# Patient Record
Sex: FEMALE | Race: WHITE | NOT HISPANIC OR LATINO | Employment: UNEMPLOYED | URBAN - METROPOLITAN AREA
[De-identification: names, ages, dates, MRNs, and addresses within clinical notes are randomized per-mention and may not be internally consistent; named-entity substitution may affect disease eponyms.]

---

## 2021-07-26 LAB — CRC RECOMMENDATION EXT: NORMAL

## 2021-09-13 ENCOUNTER — TELEPHONE (OUTPATIENT)
Dept: OPHTHALMOLOGY | Facility: CLINIC | Age: 41
End: 2021-09-13

## 2021-09-15 ENCOUNTER — OFFICE VISIT (OUTPATIENT)
Dept: OPTOMETRY | Facility: CLINIC | Age: 41
End: 2021-09-15
Payer: COMMERCIAL

## 2021-09-15 DIAGNOSIS — H52.13 MYOPIA WITH PRESBYOPIA OF BOTH EYES: Primary | ICD-10-CM

## 2021-09-15 DIAGNOSIS — H52.4 MYOPIA WITH PRESBYOPIA OF BOTH EYES: Primary | ICD-10-CM

## 2021-09-15 DIAGNOSIS — Z97.3 WEARS CONTACT LENSES: ICD-10-CM

## 2021-09-15 DIAGNOSIS — H52.13 MYOPIA WITH PRESBYOPIA OF BOTH EYES: ICD-10-CM

## 2021-09-15 DIAGNOSIS — H53.9 VISION DISTURBANCE: Primary | ICD-10-CM

## 2021-09-15 DIAGNOSIS — H52.4 MYOPIA WITH PRESBYOPIA OF BOTH EYES: ICD-10-CM

## 2021-09-15 PROCEDURE — 92310 PR CONTACT LENS FITTING (NO CHANGE): ICD-10-PCS | Mod: CSM,S$GLB,, | Performed by: OPTOMETRIST

## 2021-09-15 PROCEDURE — 92004 PR EYE EXAM, NEW PATIENT,COMPREHESV: ICD-10-PCS | Mod: S$GLB,,, | Performed by: OPTOMETRIST

## 2021-09-15 PROCEDURE — 1159F PR MEDICATION LIST DOCUMENTED IN MEDICAL RECORD: ICD-10-PCS | Mod: CPTII,S$GLB,, | Performed by: OPTOMETRIST

## 2021-09-15 PROCEDURE — 1159F MED LIST DOCD IN RCRD: CPT | Mod: CPTII,S$GLB,, | Performed by: OPTOMETRIST

## 2021-09-15 PROCEDURE — 99999 PR PBB SHADOW E&M-EST. PATIENT-LVL II: CPT | Mod: PBBFAC,,, | Performed by: OPTOMETRIST

## 2021-09-15 PROCEDURE — 99499 NO LOS: ICD-10-PCS | Mod: S$GLB,,, | Performed by: OPTOMETRIST

## 2021-09-15 PROCEDURE — 99499 UNLISTED E&M SERVICE: CPT | Mod: S$GLB,,, | Performed by: OPTOMETRIST

## 2021-09-15 PROCEDURE — 92004 COMPRE OPH EXAM NEW PT 1/>: CPT | Mod: S$GLB,,, | Performed by: OPTOMETRIST

## 2021-09-15 PROCEDURE — 99999 PR PBB SHADOW E&M-EST. PATIENT-LVL II: ICD-10-PCS | Mod: PBBFAC,,, | Performed by: OPTOMETRIST

## 2021-09-15 PROCEDURE — 92310 CONTACT LENS FITTING OU: CPT | Mod: CSM,S$GLB,, | Performed by: OPTOMETRIST

## 2021-09-15 RX ORDER — PANTOPRAZOLE SODIUM 40 MG/1
40 TABLET, DELAYED RELEASE ORAL EVERY MORNING
COMMUNITY
Start: 2021-07-26 | End: 2023-03-28

## 2021-09-15 RX ORDER — SOD SULF/POT CHLORIDE/MAG SULF 1.479 G
TABLET ORAL
COMMUNITY
Start: 2021-07-19 | End: 2021-09-15

## 2021-09-15 RX ORDER — METRONIDAZOLE 7.5 MG/G
CREAM TOPICAL
COMMUNITY
Start: 2021-05-12 | End: 2024-03-05

## 2021-09-16 PROBLEM — Z97.3 WEARS CONTACT LENSES: Status: ACTIVE | Noted: 2021-09-16

## 2021-09-27 ENCOUNTER — LAB VISIT (OUTPATIENT)
Dept: LAB | Facility: OTHER | Age: 41
End: 2021-09-27
Attending: INTERNAL MEDICINE
Payer: COMMERCIAL

## 2021-09-27 DIAGNOSIS — R14.0 ABDOMINAL DISTENTION: ICD-10-CM

## 2021-09-27 DIAGNOSIS — K22.10 ULCER OF ESOPHAGUS WITHOUT BLEEDING: ICD-10-CM

## 2021-09-27 DIAGNOSIS — K52.9 CHRONIC DIARRHEA OF UNKNOWN ORIGIN: Primary | ICD-10-CM

## 2021-09-27 LAB
25(OH)D3+25(OH)D2 SERPL-MCNC: 50 NG/ML (ref 30–96)
ALBUMIN SERPL BCP-MCNC: 4.4 G/DL (ref 3.5–5.2)
ALP SERPL-CCNC: 55 U/L (ref 55–135)
ALT SERPL W/O P-5'-P-CCNC: 10 U/L (ref 10–44)
ANION GAP SERPL CALC-SCNC: 10 MMOL/L (ref 8–16)
AST SERPL-CCNC: 25 U/L (ref 10–40)
BASOPHILS # BLD AUTO: 0.05 K/UL (ref 0–0.2)
BASOPHILS NFR BLD: 1 % (ref 0–1.9)
BILIRUB SERPL-MCNC: 0.5 MG/DL (ref 0.1–1)
BUN SERPL-MCNC: 11 MG/DL (ref 6–20)
CALCIUM SERPL-MCNC: 9.8 MG/DL (ref 8.7–10.5)
CHLORIDE SERPL-SCNC: 104 MMOL/L (ref 95–110)
CO2 SERPL-SCNC: 23 MMOL/L (ref 23–29)
CREAT SERPL-MCNC: 0.8 MG/DL (ref 0.5–1.4)
CRP SERPL-MCNC: 0.6 MG/L (ref 0–8.2)
DIFFERENTIAL METHOD: ABNORMAL
EOSINOPHIL # BLD AUTO: 0.1 K/UL (ref 0–0.5)
EOSINOPHIL NFR BLD: 2 % (ref 0–8)
ERYTHROCYTE [DISTWIDTH] IN BLOOD BY AUTOMATED COUNT: 11.9 % (ref 11.5–14.5)
EST. GFR  (AFRICAN AMERICAN): >60 ML/MIN/1.73 M^2
EST. GFR  (NON AFRICAN AMERICAN): >60 ML/MIN/1.73 M^2
GLUCOSE SERPL-MCNC: 93 MG/DL (ref 70–110)
HCT VFR BLD AUTO: 40.3 % (ref 37–48.5)
HGB BLD-MCNC: 13.6 G/DL (ref 12–16)
IMM GRANULOCYTES # BLD AUTO: 0.01 K/UL (ref 0–0.04)
IMM GRANULOCYTES NFR BLD AUTO: 0.2 % (ref 0–0.5)
LYMPHOCYTES # BLD AUTO: 2 K/UL (ref 1–4.8)
LYMPHOCYTES NFR BLD: 41.5 % (ref 18–48)
MCH RBC QN AUTO: 33.4 PG (ref 27–31)
MCHC RBC AUTO-ENTMCNC: 33.7 G/DL (ref 32–36)
MCV RBC AUTO: 99 FL (ref 82–98)
MONOCYTES # BLD AUTO: 0.4 K/UL (ref 0.3–1)
MONOCYTES NFR BLD: 9 % (ref 4–15)
NEUTROPHILS # BLD AUTO: 2.3 K/UL (ref 1.8–7.7)
NEUTROPHILS NFR BLD: 46.3 % (ref 38–73)
NRBC BLD-RTO: 0 /100 WBC
PLATELET # BLD AUTO: 230 K/UL (ref 150–450)
PMV BLD AUTO: 9.3 FL (ref 9.2–12.9)
POTASSIUM SERPL-SCNC: 4.7 MMOL/L (ref 3.5–5.1)
PROT SERPL-MCNC: 7.7 G/DL (ref 6–8.4)
RBC # BLD AUTO: 4.07 M/UL (ref 4–5.4)
SODIUM SERPL-SCNC: 137 MMOL/L (ref 136–145)
TSH SERPL DL<=0.005 MIU/L-ACNC: 1.45 UIU/ML (ref 0.4–4)
WBC # BLD AUTO: 4.91 K/UL (ref 3.9–12.7)

## 2021-09-27 PROCEDURE — 80053 COMPREHEN METABOLIC PANEL: CPT | Performed by: INTERNAL MEDICINE

## 2021-09-27 PROCEDURE — 84443 ASSAY THYROID STIM HORMONE: CPT | Performed by: INTERNAL MEDICINE

## 2021-09-27 PROCEDURE — 36415 COLL VENOUS BLD VENIPUNCTURE: CPT | Performed by: INTERNAL MEDICINE

## 2021-09-27 PROCEDURE — 86140 C-REACTIVE PROTEIN: CPT | Performed by: INTERNAL MEDICINE

## 2021-09-27 PROCEDURE — 82306 VITAMIN D 25 HYDROXY: CPT | Performed by: INTERNAL MEDICINE

## 2021-09-27 PROCEDURE — 83516 IMMUNOASSAY NONANTIBODY: CPT | Mod: 59 | Performed by: INTERNAL MEDICINE

## 2021-09-27 PROCEDURE — 85025 COMPLETE CBC W/AUTO DIFF WBC: CPT | Performed by: INTERNAL MEDICINE

## 2021-09-30 LAB
GLIADIN PEPTIDE IGA SER-ACNC: 5 UNITS
GLIADIN PEPTIDE IGG SER-ACNC: 2 UNITS
IGA SERPL-MCNC: 250 MG/DL (ref 70–400)
TTG IGA SER-ACNC: 7 UNITS
TTG IGG SER-ACNC: 3 UNITS

## 2021-12-14 ENCOUNTER — PATIENT MESSAGE (OUTPATIENT)
Dept: OBSTETRICS AND GYNECOLOGY | Facility: CLINIC | Age: 41
End: 2021-12-14

## 2021-12-14 ENCOUNTER — OFFICE VISIT (OUTPATIENT)
Dept: OBSTETRICS AND GYNECOLOGY | Facility: CLINIC | Age: 41
End: 2021-12-14
Payer: COMMERCIAL

## 2021-12-14 ENCOUNTER — PATIENT MESSAGE (OUTPATIENT)
Dept: DERMATOLOGY | Facility: CLINIC | Age: 41
End: 2021-12-14

## 2021-12-14 ENCOUNTER — OFFICE VISIT (OUTPATIENT)
Dept: DERMATOLOGY | Facility: CLINIC | Age: 41
End: 2021-12-14
Payer: COMMERCIAL

## 2021-12-14 VITALS
WEIGHT: 156.31 LBS | BODY MASS INDEX: 26.04 KG/M2 | HEIGHT: 65 IN | DIASTOLIC BLOOD PRESSURE: 82 MMHG | SYSTOLIC BLOOD PRESSURE: 106 MMHG

## 2021-12-14 DIAGNOSIS — Z01.419 WELL WOMAN EXAM: ICD-10-CM

## 2021-12-14 DIAGNOSIS — B09 VIRAL EXANTHEM: ICD-10-CM

## 2021-12-14 DIAGNOSIS — Z12.39 ENCOUNTER FOR SCREENING BREAST EXAMINATION: Primary | ICD-10-CM

## 2021-12-14 DIAGNOSIS — R21 SKIN RASH: ICD-10-CM

## 2021-12-14 PROCEDURE — 99999 PR PBB SHADOW E&M-NEW PATIENT-LVL III: CPT | Mod: PBBFAC,,, | Performed by: OBSTETRICS & GYNECOLOGY

## 2021-12-14 PROCEDURE — 99386 PR PREVENTIVE VISIT,NEW,40-64: ICD-10-PCS | Mod: S$GLB,,, | Performed by: OBSTETRICS & GYNECOLOGY

## 2021-12-14 PROCEDURE — 99999 PR PBB SHADOW E&M-EST. PATIENT-LVL II: CPT | Mod: PBBFAC,,, | Performed by: DERMATOLOGY

## 2021-12-14 PROCEDURE — 99204 OFFICE O/P NEW MOD 45 MIN: CPT | Mod: S$GLB,,, | Performed by: DERMATOLOGY

## 2021-12-14 PROCEDURE — 99999 PR PBB SHADOW E&M-EST. PATIENT-LVL II: ICD-10-PCS | Mod: PBBFAC,,, | Performed by: DERMATOLOGY

## 2021-12-14 PROCEDURE — 99204 PR OFFICE/OUTPT VISIT, NEW, LEVL IV, 45-59 MIN: ICD-10-PCS | Mod: S$GLB,,, | Performed by: DERMATOLOGY

## 2021-12-14 PROCEDURE — 99386 PREV VISIT NEW AGE 40-64: CPT | Mod: S$GLB,,, | Performed by: OBSTETRICS & GYNECOLOGY

## 2021-12-14 PROCEDURE — 99999 PR PBB SHADOW E&M-NEW PATIENT-LVL III: ICD-10-PCS | Mod: PBBFAC,,, | Performed by: OBSTETRICS & GYNECOLOGY

## 2021-12-14 PROCEDURE — 87624 HPV HI-RISK TYP POOLED RSLT: CPT | Performed by: OBSTETRICS & GYNECOLOGY

## 2021-12-14 RX ORDER — TRIAMCINOLONE ACETONIDE 1 MG/G
CREAM TOPICAL 2 TIMES DAILY
Qty: 454 G | Refills: 1 | Status: SHIPPED | OUTPATIENT
Start: 2021-12-14 | End: 2023-03-28 | Stop reason: ALTCHOICE

## 2021-12-14 NOTE — PROGRESS NOTES
Subjective:       Patient ID:  Dot Doyle is a 41 y.o. female who presents for   Chief Complaint   Patient presents with    Rash     Patient is a 40 yo female present for Rash on Ablodomen and legs. Rash present for 1 day. Red and itching No treatment  was referred by her OBGYN for this rash. Pt denies any new medications prior to rash erupting. Has had no fevers, chills, congestion.  It is red and splotchy. She has not had a similar rash previously.    Review of Systems   Skin: Positive for itching, rash and dry skin.        Objective:    Physical Exam   Constitutional: She appears well-developed and well-nourished.   Neurological: She is alert and oriented to person, place, and time.   Psychiatric: She has a normal mood and affect.   Skin:   Areas Examined (abnormalities noted in diagram):   Scalp / Hair Palpated and Inspected  Head / Face Inspection Performed  Neck Inspection Performed  Chest / Axilla Inspection Performed  Abdomen Inspection Performed  Genitals / Buttocks / Groin Inspection Performed  Back Inspection Performed  RUE Inspected  LUE Inspection Performed  RLE Inspected  LLE Inspection Performed  Nails and Digits Inspection Performed              Diagram Legend     Erythematous scaling macule/papule c/w actinic keratosis       Vascular papule c/w angioma      Pigmented verrucoid papule/plaque c/w seborrheic keratosis      Yellow umbilicated papule c/w sebaceous hyperplasia      Irregularly shaped tan macule c/w lentigo     1-2 mm smooth white papules consistent with Milia      Movable subcutaneous cyst with punctum c/w epidermal inclusion cyst      Subcutaneous movable cyst c/w pilar cyst      Firm pink to brown papule c/w dermatofibroma      Pedunculated fleshy papule(s) c/w skin tag(s)      Evenly pigmented macule c/w junctional nevus     Mildly variegated pigmented, slightly irregular-bordered macule c/w mildly atypical nevus      Flesh colored to evenly pigmented papule c/w intradermal  nevus       Pink pearly papule/plaque c/w basal cell carcinoma      Erythematous hyperkeratotic cursted plaque c/w SCC      Surgical scar with no sign of skin cancer recurrence      Open and closed comedones      Inflammatory papules and pustules      Verrucoid papule consistent consistent with wart     Erythematous eczematous patches and plaques     Dystrophic onycholytic nail with subungual debris c/w onychomycosis     Umbilicated papule    Erythematous-base heme-crusted tan verrucoid plaque consistent with inflamed seborrheic keratosis     Erythematous Silvery Scaling Plaque c/w Psoriasis     See annotation      Assessment / Plan:        Viral exanthem  -     triamcinolone acetonide 0.1% (KENALOG) 0.1 % cream; Apply topically 2 (two) times daily. To rash x 2 weeks PRN  Dispense: 454 g; Refill: 1  Advised pt to obtain COVID testing as COVID can manifest with a similar exanthem although her rash is nonspecific and could be associated with several viruses -  Pt is otherwise asymptomatic but will provide topical steroid to assist with healing    Notified that rash may spread before resolving           Follow up if symptoms worsen or fail to improve.

## 2021-12-18 LAB
CYTOLOGIST CVX/VAG CYTO: NORMAL
CYTOLOGY CVX/VAG DOC CYTO: NORMAL
CYTOLOGY CVX/VAG DOC THIN PREP: NORMAL
CYTOLOGY THINPREP PAP COMMENT: NORMAL
HPV HR 12 DNA CVX QL NAA+PROBE: NEGATIVE
HPV16 DNA CVX QL NAA+PROBE: NEGATIVE
HPV18 DNA CVX QL NAA+PROBE: NEGATIVE
PAP NOTE: NORMAL
STAT OF ADQ CVX/VAG CYTO-IMP: NORMAL

## 2022-07-08 ENCOUNTER — PATIENT MESSAGE (OUTPATIENT)
Dept: OPTOMETRY | Facility: CLINIC | Age: 42
End: 2022-07-08
Payer: COMMERCIAL

## 2022-07-18 ENCOUNTER — PATIENT MESSAGE (OUTPATIENT)
Dept: DERMATOLOGY | Facility: CLINIC | Age: 42
End: 2022-07-18
Payer: COMMERCIAL

## 2022-08-30 ENCOUNTER — OFFICE VISIT (OUTPATIENT)
Dept: OBSTETRICS AND GYNECOLOGY | Facility: CLINIC | Age: 42
End: 2022-08-30
Attending: OBSTETRICS & GYNECOLOGY
Payer: COMMERCIAL

## 2022-08-30 VITALS
BODY MASS INDEX: 25.71 KG/M2 | DIASTOLIC BLOOD PRESSURE: 79 MMHG | HEIGHT: 65 IN | SYSTOLIC BLOOD PRESSURE: 108 MMHG | WEIGHT: 154.31 LBS

## 2022-08-30 DIAGNOSIS — N93.9 ABNORMAL UTERINE BLEEDING (AUB): ICD-10-CM

## 2022-08-30 DIAGNOSIS — R53.83 FATIGUE, UNSPECIFIED TYPE: ICD-10-CM

## 2022-08-30 DIAGNOSIS — N92.4 EXCESSIVE BLEEDING IN PREMENOPAUSAL PERIOD: Primary | ICD-10-CM

## 2022-08-30 DIAGNOSIS — Z12.39 SCREENING BREAST EXAMINATION: ICD-10-CM

## 2022-08-30 PROCEDURE — 99999 PR PBB SHADOW E&M-EST. PATIENT-LVL III: ICD-10-PCS | Mod: PBBFAC,,, | Performed by: OBSTETRICS & GYNECOLOGY

## 2022-08-30 PROCEDURE — 3044F PR MOST RECENT HEMOGLOBIN A1C LEVEL <7.0%: ICD-10-PCS | Mod: CPTII,S$GLB,, | Performed by: OBSTETRICS & GYNECOLOGY

## 2022-08-30 PROCEDURE — 99213 OFFICE O/P EST LOW 20 MIN: CPT | Mod: S$GLB,,, | Performed by: OBSTETRICS & GYNECOLOGY

## 2022-08-30 PROCEDURE — 99999 PR PBB SHADOW E&M-EST. PATIENT-LVL III: CPT | Mod: PBBFAC,,, | Performed by: OBSTETRICS & GYNECOLOGY

## 2022-08-30 PROCEDURE — 3078F PR MOST RECENT DIASTOLIC BLOOD PRESSURE < 80 MM HG: ICD-10-PCS | Mod: CPTII,S$GLB,, | Performed by: OBSTETRICS & GYNECOLOGY

## 2022-08-30 PROCEDURE — 3074F SYST BP LT 130 MM HG: CPT | Mod: CPTII,S$GLB,, | Performed by: OBSTETRICS & GYNECOLOGY

## 2022-08-30 PROCEDURE — 3074F PR MOST RECENT SYSTOLIC BLOOD PRESSURE < 130 MM HG: ICD-10-PCS | Mod: CPTII,S$GLB,, | Performed by: OBSTETRICS & GYNECOLOGY

## 2022-08-30 PROCEDURE — 3044F HG A1C LEVEL LT 7.0%: CPT | Mod: CPTII,S$GLB,, | Performed by: OBSTETRICS & GYNECOLOGY

## 2022-08-30 PROCEDURE — 3078F DIAST BP <80 MM HG: CPT | Mod: CPTII,S$GLB,, | Performed by: OBSTETRICS & GYNECOLOGY

## 2022-08-30 PROCEDURE — 99213 PR OFFICE/OUTPT VISIT, EST, LEVL III, 20-29 MIN: ICD-10-PCS | Mod: S$GLB,,, | Performed by: OBSTETRICS & GYNECOLOGY

## 2022-08-30 NOTE — PROGRESS NOTES
"CC:change in menstrual cycles    HPI:periods are now occurring every 3 weeks, now very consistent.  Has Paragard IUD has had x 5 years due to heterozygous factor V and MTHFR mutation. Prior to that had been on OCPs.   Gained weight with Covid and has struggled to lose weight. Recently tried strict keto and no results.  No hot flashes or night sweats, has noted more irritability and cramping prior to periods. Periods are now heavier but lasting only 4 days.    Recently moved back to the area from [place], has been doing hour blast 2-3x /week, yoga, running.    ROS:  GENERAL: Feeling well overall. Denies fever or chills.   SKIN: Denies rash or lesions.   HEAD: Denies head injury or headache.   NODES: Denies enlarged lymph nodes.   CHEST: Denies chest pain or shortness of breath.   CARDIOVASCULAR: Denies palpitations or left sided chest pain.   ABDOMEN: No abdominal pain, constipation, diarrhea, vomiting or rectal bleeding. + nausea (neg UPT)  URINARY: No dysuria, hematuria, or burning on urination.  REPRODUCTIVE: See HPI.   BREASTS: Denies pain, lumps, or nipple discharge.   HEMATOLOGIC: No easy bruisability or excessive bleeding.   MUSCULOSKELETAL: Denies joint pain or swelling.   NEUROLOGIC: Denies syncope or weakness.   PSYCHIATRIC: Denies depression, anxiety or mood swings.    PE:   /79 (BP Location: Right arm, Patient Position: Sitting, BP Method: Medium (Automatic))   Ht 5' 5" (1.651 m)   Wt 70 kg (154 lb 5.2 oz)   LMP 08/30/2022 (Approximate)   BMI 25.68 kg/m²     APPEARANCE: Well nourished, well developed, White female in no acute distress.  NODES: no cervical, supraclavicular, or inguinal lymphadenopathy  BREASTS: Symmetrical, no skin changes or visible lesions. No palpable masses, nipple discharge or adenopathy bilaterally.  ABDOMEN: Soft. No tenderness or masses. No distention. No hernias palpated. No CVA tenderness.  VULVA: No lesions. Normal external female genitalia.  URETHRAL MEATUS: Normal " size and location, no lesions, no prolapse.  URETHRA: No masses, tenderness, or prolapse.  VAGINA: Moist. No lesions or lacerations noted. No abnormal discharge present. No odor present.   CERVIX: No lesions or discharge. No cervical motion tenderness.   UTERUS: Normal size, regular shape, mobile, non-tender.  ADNEXA: No tenderness. No fullness or masses palpated in the adnexal regions.   ANUS PERINEUM: Normal.      Diagnosis:  1. Excessive bleeding in premenopausal period    2. Fatigue, unspecified type    3. Screening breast examination    4. Abnormal uterine bleeding (AUB)        Plan:   Return for mirena insertion and paragard removal  Orders Placed This Encounter    Mammo Digital Screening Bilat w/ Brennan    US Pelvis Comp with Transvag NON-OB (xpd    TSH    T4, FREE    Follicle Stimulating Hormone    CBC Auto Differential    Estradiol    Testosterone, free    Hemoglobin A1C         Gwen Rodríguez, DO

## 2022-08-31 ENCOUNTER — PATIENT MESSAGE (OUTPATIENT)
Dept: OBSTETRICS AND GYNECOLOGY | Facility: CLINIC | Age: 42
End: 2022-08-31
Payer: COMMERCIAL

## 2022-08-31 ENCOUNTER — LAB VISIT (OUTPATIENT)
Dept: LAB | Facility: OTHER | Age: 42
End: 2022-08-31
Attending: OBSTETRICS & GYNECOLOGY
Payer: COMMERCIAL

## 2022-08-31 DIAGNOSIS — R53.83 FATIGUE, UNSPECIFIED TYPE: ICD-10-CM

## 2022-08-31 DIAGNOSIS — Z30.9 ENCOUNTER FOR CONTRACEPTIVE MANAGEMENT, UNSPECIFIED TYPE: Primary | ICD-10-CM

## 2022-08-31 DIAGNOSIS — N92.4 EXCESSIVE BLEEDING IN PREMENOPAUSAL PERIOD: ICD-10-CM

## 2022-08-31 LAB
BASOPHILS # BLD AUTO: 0.05 K/UL (ref 0–0.2)
BASOPHILS NFR BLD: 1.1 % (ref 0–1.9)
DIFFERENTIAL METHOD: ABNORMAL
EOSINOPHIL # BLD AUTO: 0.1 K/UL (ref 0–0.5)
EOSINOPHIL NFR BLD: 2.1 % (ref 0–8)
ERYTHROCYTE [DISTWIDTH] IN BLOOD BY AUTOMATED COUNT: 12.1 % (ref 11.5–14.5)
ESTIMATED AVG GLUCOSE: 97 MG/DL (ref 68–131)
ESTRADIOL SERPL-MCNC: 170 PG/ML
FSH SERPL-ACNC: 3 MIU/ML
HBA1C MFR BLD: 5 % (ref 4–5.6)
HCT VFR BLD AUTO: 40.4 % (ref 37–48.5)
HGB BLD-MCNC: 13.6 G/DL (ref 12–16)
IMM GRANULOCYTES # BLD AUTO: 0.01 K/UL (ref 0–0.04)
IMM GRANULOCYTES NFR BLD AUTO: 0.2 % (ref 0–0.5)
LYMPHOCYTES # BLD AUTO: 1.8 K/UL (ref 1–4.8)
LYMPHOCYTES NFR BLD: 36.8 % (ref 18–48)
MCH RBC QN AUTO: 33.3 PG (ref 27–31)
MCHC RBC AUTO-ENTMCNC: 33.7 G/DL (ref 32–36)
MCV RBC AUTO: 99 FL (ref 82–98)
MONOCYTES # BLD AUTO: 0.4 K/UL (ref 0.3–1)
MONOCYTES NFR BLD: 9.3 % (ref 4–15)
NEUTROPHILS # BLD AUTO: 2.4 K/UL (ref 1.8–7.7)
NEUTROPHILS NFR BLD: 50.5 % (ref 38–73)
NRBC BLD-RTO: 0 /100 WBC
PLATELET # BLD AUTO: 247 K/UL (ref 150–450)
PMV BLD AUTO: 9.3 FL (ref 9.2–12.9)
RBC # BLD AUTO: 4.08 M/UL (ref 4–5.4)
T4 FREE SERPL-MCNC: 0.98 NG/DL (ref 0.71–1.51)
TSH SERPL DL<=0.005 MIU/L-ACNC: 1.72 UIU/ML (ref 0.4–4)
WBC # BLD AUTO: 4.75 K/UL (ref 3.9–12.7)

## 2022-08-31 PROCEDURE — 83001 ASSAY OF GONADOTROPIN (FSH): CPT | Performed by: OBSTETRICS & GYNECOLOGY

## 2022-08-31 PROCEDURE — 84443 ASSAY THYROID STIM HORMONE: CPT | Performed by: OBSTETRICS & GYNECOLOGY

## 2022-08-31 PROCEDURE — 84402 ASSAY OF FREE TESTOSTERONE: CPT | Performed by: OBSTETRICS & GYNECOLOGY

## 2022-08-31 PROCEDURE — 36415 COLL VENOUS BLD VENIPUNCTURE: CPT | Performed by: OBSTETRICS & GYNECOLOGY

## 2022-08-31 PROCEDURE — 83036 HEMOGLOBIN GLYCOSYLATED A1C: CPT | Performed by: OBSTETRICS & GYNECOLOGY

## 2022-08-31 PROCEDURE — 82670 ASSAY OF TOTAL ESTRADIOL: CPT | Performed by: OBSTETRICS & GYNECOLOGY

## 2022-08-31 PROCEDURE — 85025 COMPLETE CBC W/AUTO DIFF WBC: CPT | Performed by: OBSTETRICS & GYNECOLOGY

## 2022-08-31 PROCEDURE — 84439 ASSAY OF FREE THYROXINE: CPT | Performed by: OBSTETRICS & GYNECOLOGY

## 2022-09-01 ENCOUNTER — OFFICE VISIT (OUTPATIENT)
Dept: OPTOMETRY | Facility: CLINIC | Age: 42
End: 2022-09-01
Payer: COMMERCIAL

## 2022-09-01 DIAGNOSIS — H52.13 MYOPIA WITH PRESBYOPIA OF BOTH EYES: Primary | ICD-10-CM

## 2022-09-01 DIAGNOSIS — H52.13 MYOPIA WITH PRESBYOPIA OF BOTH EYES: ICD-10-CM

## 2022-09-01 DIAGNOSIS — Z97.3 WEARS CONTACT LENSES: ICD-10-CM

## 2022-09-01 DIAGNOSIS — H52.4 MYOPIA WITH PRESBYOPIA OF BOTH EYES: ICD-10-CM

## 2022-09-01 DIAGNOSIS — H53.9 VISION DISTURBANCE: Primary | ICD-10-CM

## 2022-09-01 DIAGNOSIS — H52.4 MYOPIA WITH PRESBYOPIA OF BOTH EYES: Primary | ICD-10-CM

## 2022-09-01 PROCEDURE — 3044F HG A1C LEVEL LT 7.0%: CPT | Mod: CPTII,S$GLB,, | Performed by: OPTOMETRIST

## 2022-09-01 PROCEDURE — 99499 NO LOS: ICD-10-PCS | Mod: S$GLB,,, | Performed by: OPTOMETRIST

## 2022-09-01 PROCEDURE — 99999 PR PBB SHADOW E&M-EST. PATIENT-LVL III: ICD-10-PCS | Mod: PBBFAC,,, | Performed by: OPTOMETRIST

## 2022-09-01 PROCEDURE — 92014 COMPRE OPH EXAM EST PT 1/>: CPT | Mod: S$GLB,,, | Performed by: OPTOMETRIST

## 2022-09-01 PROCEDURE — 99499 UNLISTED E&M SERVICE: CPT | Mod: S$GLB,,, | Performed by: OPTOMETRIST

## 2022-09-01 PROCEDURE — 92310 PR CONTACT LENS FITTING (NO CHANGE): ICD-10-PCS | Mod: CSM,,, | Performed by: OPTOMETRIST

## 2022-09-01 PROCEDURE — 1159F MED LIST DOCD IN RCRD: CPT | Mod: CPTII,S$GLB,, | Performed by: OPTOMETRIST

## 2022-09-01 PROCEDURE — 99999 PR PBB SHADOW E&M-EST. PATIENT-LVL III: CPT | Mod: PBBFAC,,, | Performed by: OPTOMETRIST

## 2022-09-01 PROCEDURE — 92310 CONTACT LENS FITTING OU: CPT | Mod: CSM,,, | Performed by: OPTOMETRIST

## 2022-09-01 PROCEDURE — 92014 PR EYE EXAM, EST PATIENT,COMPREHESV: ICD-10-PCS | Mod: S$GLB,,, | Performed by: OPTOMETRIST

## 2022-09-01 PROCEDURE — 1159F PR MEDICATION LIST DOCUMENTED IN MEDICAL RECORD: ICD-10-PCS | Mod: CPTII,S$GLB,, | Performed by: OPTOMETRIST

## 2022-09-01 PROCEDURE — 3044F PR MOST RECENT HEMOGLOBIN A1C LEVEL <7.0%: ICD-10-PCS | Mod: CPTII,S$GLB,, | Performed by: OPTOMETRIST

## 2022-09-01 NOTE — PROGRESS NOTES
HPI    Last eye exam was 9/15/21 with Dr. Martinez.  Patient states unable to tolerate monovision-doesn't want to try   multifocals and would rather go back to distance OU with readers. Removes   and replaces daily.  Patient denies diplopia, headaches, flashes/floaters, and pain.    Last edited by Mary Porter MA on 9/1/2022  2:19 PM.            Assessment /Plan     For exam results, see Encounter Report.    Vision disturbance    Myopia with presbyopia of both eyes    Wears contact lenses          1-3.  Contact lens rx given.  Eye health normal OU.   RTC 1 year for routine exam.

## 2022-09-01 NOTE — PROGRESS NOTES
Assessment /Plan     For exam results, see Encounter Report.    Myopia with presbyopia of both eyes    Wears contact lenses          1-2.  See note with same date.

## 2022-09-02 LAB — TESTOST FREE SERPL-MCNC: 1.3 PG/ML

## 2022-09-06 ENCOUNTER — PATIENT MESSAGE (OUTPATIENT)
Dept: OBSTETRICS AND GYNECOLOGY | Facility: CLINIC | Age: 42
End: 2022-09-06
Payer: COMMERCIAL

## 2022-09-08 ENCOUNTER — PATIENT MESSAGE (OUTPATIENT)
Dept: OBSTETRICS AND GYNECOLOGY | Facility: CLINIC | Age: 42
End: 2022-09-08
Payer: COMMERCIAL

## 2022-09-09 ENCOUNTER — PATIENT MESSAGE (OUTPATIENT)
Dept: OBSTETRICS AND GYNECOLOGY | Facility: CLINIC | Age: 42
End: 2022-09-09
Payer: COMMERCIAL

## 2022-09-09 ENCOUNTER — PATIENT MESSAGE (OUTPATIENT)
Dept: OPTOMETRY | Facility: CLINIC | Age: 42
End: 2022-09-09
Payer: COMMERCIAL

## 2022-09-09 DIAGNOSIS — R53.83 FATIGUE, UNSPECIFIED TYPE: Primary | ICD-10-CM

## 2022-09-10 ENCOUNTER — HOSPITAL ENCOUNTER (OUTPATIENT)
Dept: RADIOLOGY | Facility: OTHER | Age: 42
Discharge: HOME | End: 2022-09-10
Attending: OBSTETRICS & GYNECOLOGY
Payer: COMMERCIAL

## 2022-09-10 DIAGNOSIS — N93.9 ABNORMAL UTERINE BLEEDING (AUB): ICD-10-CM

## 2022-09-10 PROCEDURE — 76830 TRANSVAGINAL US NON-OB: CPT | Mod: TC

## 2022-09-10 PROCEDURE — 76856 US PELVIS COMP WITH TRANSVAG NON-OB (XPD): ICD-10-PCS | Mod: 26,,, | Performed by: RADIOLOGY

## 2022-09-10 PROCEDURE — 76856 US EXAM PELVIC COMPLETE: CPT | Mod: 26,,, | Performed by: RADIOLOGY

## 2022-09-10 PROCEDURE — 76830 TRANSVAGINAL US NON-OB: CPT | Mod: 26,,, | Performed by: RADIOLOGY

## 2022-09-10 PROCEDURE — 76830 US PELVIS COMP WITH TRANSVAG NON-OB (XPD): ICD-10-PCS | Mod: 26,,, | Performed by: RADIOLOGY

## 2022-09-12 ENCOUNTER — PATIENT MESSAGE (OUTPATIENT)
Dept: OBSTETRICS AND GYNECOLOGY | Facility: CLINIC | Age: 42
End: 2022-09-12
Payer: COMMERCIAL

## 2022-09-12 ENCOUNTER — LAB VISIT (OUTPATIENT)
Dept: LAB | Facility: OTHER | Age: 42
End: 2022-09-12
Attending: OBSTETRICS & GYNECOLOGY
Payer: COMMERCIAL

## 2022-09-12 DIAGNOSIS — R53.83 FATIGUE, UNSPECIFIED TYPE: ICD-10-CM

## 2022-09-12 LAB
FERRITIN SERPL-MCNC: 127 NG/ML (ref 20–300)
FOLATE SERPL-MCNC: 8 NG/ML (ref 4–24)
IRON SERPL-MCNC: 94 UG/DL (ref 30–160)
VIT B12 SERPL-MCNC: 274 PG/ML (ref 210–950)

## 2022-09-12 PROCEDURE — 83540 ASSAY OF IRON: CPT | Performed by: OBSTETRICS & GYNECOLOGY

## 2022-09-12 PROCEDURE — 82607 VITAMIN B-12: CPT | Performed by: OBSTETRICS & GYNECOLOGY

## 2022-09-12 PROCEDURE — 36415 COLL VENOUS BLD VENIPUNCTURE: CPT | Performed by: OBSTETRICS & GYNECOLOGY

## 2022-09-12 PROCEDURE — 82746 ASSAY OF FOLIC ACID SERUM: CPT | Performed by: OBSTETRICS & GYNECOLOGY

## 2022-09-12 PROCEDURE — 82728 ASSAY OF FERRITIN: CPT | Performed by: OBSTETRICS & GYNECOLOGY

## 2022-10-04 ENCOUNTER — HOSPITAL ENCOUNTER (OUTPATIENT)
Dept: RADIOLOGY | Facility: OTHER | Age: 42
Discharge: HOME | End: 2022-10-04
Attending: OBSTETRICS & GYNECOLOGY
Payer: COMMERCIAL

## 2022-10-04 DIAGNOSIS — Z12.39 SCREENING BREAST EXAMINATION: ICD-10-CM

## 2022-10-04 PROCEDURE — 77063 BREAST TOMOSYNTHESIS BI: CPT | Mod: TC

## 2022-10-04 PROCEDURE — 77067 MAMMO DIGITAL SCREENING BILAT WITH TOMO: ICD-10-PCS | Mod: 26,,, | Performed by: RADIOLOGY

## 2022-10-04 PROCEDURE — 77063 BREAST TOMOSYNTHESIS BI: CPT | Mod: 26,,, | Performed by: RADIOLOGY

## 2022-10-04 PROCEDURE — 77063 MAMMO DIGITAL SCREENING BILAT WITH TOMO: ICD-10-PCS | Mod: 26,,, | Performed by: RADIOLOGY

## 2022-10-04 PROCEDURE — 77067 SCR MAMMO BI INCL CAD: CPT | Mod: 26,,, | Performed by: RADIOLOGY

## 2022-10-04 PROCEDURE — 77067 SCR MAMMO BI INCL CAD: CPT | Mod: TC

## 2022-10-12 ENCOUNTER — PROCEDURE VISIT (OUTPATIENT)
Dept: OBSTETRICS AND GYNECOLOGY | Facility: CLINIC | Age: 42
End: 2022-10-12
Attending: OBSTETRICS & GYNECOLOGY
Payer: COMMERCIAL

## 2022-10-12 VITALS
HEIGHT: 65 IN | WEIGHT: 157.44 LBS | DIASTOLIC BLOOD PRESSURE: 66 MMHG | SYSTOLIC BLOOD PRESSURE: 104 MMHG | BODY MASS INDEX: 26.23 KG/M2

## 2022-10-12 DIAGNOSIS — Z30.433 ENCOUNTER FOR IUD REMOVAL AND REINSERTION: ICD-10-CM

## 2022-10-12 DIAGNOSIS — Z30.430 ENCOUNTER FOR INSERTION OF MIRENA IUD: Primary | ICD-10-CM

## 2022-10-12 LAB
B-HCG UR QL: NEGATIVE
CTP QC/QA: YES

## 2022-10-12 PROCEDURE — 58301 REMOVAL OF INTRAUTERINE DEVICE-TODAY: ICD-10-PCS | Mod: S$GLB,,, | Performed by: OBSTETRICS & GYNECOLOGY

## 2022-10-12 PROCEDURE — 58301 REMOVE INTRAUTERINE DEVICE: CPT | Mod: S$GLB,,, | Performed by: OBSTETRICS & GYNECOLOGY

## 2022-10-12 PROCEDURE — 81025 POCT URINE PREGNANCY: ICD-10-PCS | Mod: S$GLB,,, | Performed by: OBSTETRICS & GYNECOLOGY

## 2022-10-12 PROCEDURE — 58300 INSERTION OF INTRAUTERINE DEVICE: ICD-10-PCS | Mod: S$GLB,,, | Performed by: OBSTETRICS & GYNECOLOGY

## 2022-10-12 PROCEDURE — 58300 INSERT INTRAUTERINE DEVICE: CPT | Mod: S$GLB,,, | Performed by: OBSTETRICS & GYNECOLOGY

## 2022-10-12 PROCEDURE — 81025 URINE PREGNANCY TEST: CPT | Mod: S$GLB,,, | Performed by: OBSTETRICS & GYNECOLOGY

## 2022-10-12 NOTE — PROCEDURES
Removal of Intrauterine Device-Today    Date/Time: 10/12/2022 2:15 PM  Performed by: Gwen Rodríguez DO  Authorized by: Gwen Rodríguez DO     Consent obtained:  Written  Consent given by:  Patient  Procedure risks and benefits discussed: yes    Patient questions answered: yes    Patient agrees, verbalizes understanding, and wants to proceed: yes    Educational handouts given: yes    Instructions and paperwork completed: yes    Patient states understanding of procedure being performed: yes    Relevant documents present and verified: yes    IUD grasped by: forceps  Removal due to infection and inflammatory reaction: no    Other reason for removal:  Heavy bleeding  Removal due to mechanical complications of IUD/Nexplanon: no    Removed with no complications: yes

## 2022-10-12 NOTE — PROCEDURES
INSERTION OF INTRAUTERINE DEVICE    Date/Time: 10/12/2022 2:15 PM  Performed by: Gwen Rodríguez DO  Authorized by: Gwen Rodríguez DO     Consent:     Consent obtained:  Written    Consent given by:  Patient    Procedure risks and benefits discussed: yes      Patient questions answered: yes      Patient agrees, verbalizes understanding, and wants to proceed: yes      Educational handouts given: yes      Instructions and paperwork completed: yes    Procedure:     Pelvic exam performed: yes      Negative urine pregnancy test: yes      Cervix cleaned and prepped: yes      Speculum placed in vagina: yes      Tenaculum applied to cervix: allis placed on anterior lip of cervix.      Uterus sounded: yes      Uterus sound depth (cm):  8    IUD inserted with no complications: yes      Strings trimmed: yes    1 Intra Uterine Device levonorgestreL 20 mcg/24 hours (8 yrs) 52 mg     Post-procedure:     Patient tolerated procedure well: yes      Patient will follow up after next period: yes

## 2022-10-21 ENCOUNTER — PATIENT MESSAGE (OUTPATIENT)
Dept: OBSTETRICS AND GYNECOLOGY | Facility: CLINIC | Age: 42
End: 2022-10-21
Payer: COMMERCIAL

## 2022-10-21 DIAGNOSIS — Z91.89 INCREASED RISK OF BREAST CANCER: Primary | ICD-10-CM

## 2022-11-09 ENCOUNTER — OFFICE VISIT (OUTPATIENT)
Dept: OBSTETRICS AND GYNECOLOGY | Facility: CLINIC | Age: 42
End: 2022-11-09
Attending: OBSTETRICS & GYNECOLOGY
Payer: COMMERCIAL

## 2022-11-09 VITALS
WEIGHT: 154.31 LBS | HEIGHT: 65 IN | DIASTOLIC BLOOD PRESSURE: 68 MMHG | BODY MASS INDEX: 25.71 KG/M2 | SYSTOLIC BLOOD PRESSURE: 102 MMHG

## 2022-11-09 DIAGNOSIS — Z30.431 IUD CHECK UP: Primary | ICD-10-CM

## 2022-11-09 PROCEDURE — 3008F BODY MASS INDEX DOCD: CPT | Mod: CPTII,S$GLB,, | Performed by: OBSTETRICS & GYNECOLOGY

## 2022-11-09 PROCEDURE — 3078F PR MOST RECENT DIASTOLIC BLOOD PRESSURE < 80 MM HG: ICD-10-PCS | Mod: CPTII,S$GLB,, | Performed by: OBSTETRICS & GYNECOLOGY

## 2022-11-09 PROCEDURE — 3044F PR MOST RECENT HEMOGLOBIN A1C LEVEL <7.0%: ICD-10-PCS | Mod: CPTII,S$GLB,, | Performed by: OBSTETRICS & GYNECOLOGY

## 2022-11-09 PROCEDURE — 99213 PR OFFICE/OUTPT VISIT, EST, LEVL III, 20-29 MIN: ICD-10-PCS | Mod: S$GLB,,, | Performed by: OBSTETRICS & GYNECOLOGY

## 2022-11-09 PROCEDURE — 3044F HG A1C LEVEL LT 7.0%: CPT | Mod: CPTII,S$GLB,, | Performed by: OBSTETRICS & GYNECOLOGY

## 2022-11-09 PROCEDURE — 3074F SYST BP LT 130 MM HG: CPT | Mod: CPTII,S$GLB,, | Performed by: OBSTETRICS & GYNECOLOGY

## 2022-11-09 PROCEDURE — 3078F DIAST BP <80 MM HG: CPT | Mod: CPTII,S$GLB,, | Performed by: OBSTETRICS & GYNECOLOGY

## 2022-11-09 PROCEDURE — 3074F PR MOST RECENT SYSTOLIC BLOOD PRESSURE < 130 MM HG: ICD-10-PCS | Mod: CPTII,S$GLB,, | Performed by: OBSTETRICS & GYNECOLOGY

## 2022-11-09 PROCEDURE — 3008F PR BODY MASS INDEX (BMI) DOCUMENTED: ICD-10-PCS | Mod: CPTII,S$GLB,, | Performed by: OBSTETRICS & GYNECOLOGY

## 2022-11-09 PROCEDURE — 1159F MED LIST DOCD IN RCRD: CPT | Mod: CPTII,S$GLB,, | Performed by: OBSTETRICS & GYNECOLOGY

## 2022-11-09 PROCEDURE — 1159F PR MEDICATION LIST DOCUMENTED IN MEDICAL RECORD: ICD-10-PCS | Mod: CPTII,S$GLB,, | Performed by: OBSTETRICS & GYNECOLOGY

## 2022-11-09 PROCEDURE — 99213 OFFICE O/P EST LOW 20 MIN: CPT | Mod: S$GLB,,, | Performed by: OBSTETRICS & GYNECOLOGY

## 2022-11-09 PROCEDURE — 99999 PR PBB SHADOW E&M-EST. PATIENT-LVL III: CPT | Mod: PBBFAC,,, | Performed by: OBSTETRICS & GYNECOLOGY

## 2022-11-09 PROCEDURE — 99999 PR PBB SHADOW E&M-EST. PATIENT-LVL III: ICD-10-PCS | Mod: PBBFAC,,, | Performed by: OBSTETRICS & GYNECOLOGY

## 2022-11-09 NOTE — PROGRESS NOTES
CC: IUD check    Dot Doyle is a 41 y.o. female  presents for IUD check.  Patient had a Mirena placed on 2022.  She is not having problems with bleeding, cramping, fever or discharge.  She is able to feel the strings and feels they are too long.      PHYSICAL EXAM:  Abdomen:  Soft, non-tender, non-distended  Vulva:  No lesions  Vaginal:  No lesions, no abnormal discharge  Cervix: No discharge, no CMT, IUD strings visible at os, trimmed, now 1.5cm  Uterus:  Normal size, non-tender  Adnexa:  No masses, non-tender    ASSESSMENT AND PLAN:  1. IUD surveillance    Patient counseled on IUD danger signs and how to check the strings reviewed.    Return for annual GYN exam

## 2022-11-11 ENCOUNTER — OFFICE VISIT (OUTPATIENT)
Dept: HEMATOLOGY/ONCOLOGY | Facility: CLINIC | Age: 42
End: 2022-11-11
Payer: COMMERCIAL

## 2022-11-11 ENCOUNTER — PATIENT MESSAGE (OUTPATIENT)
Dept: HEMATOLOGY/ONCOLOGY | Facility: CLINIC | Age: 42
End: 2022-11-11

## 2022-11-11 VITALS
OXYGEN SATURATION: 100 % | WEIGHT: 154 LBS | DIASTOLIC BLOOD PRESSURE: 69 MMHG | BODY MASS INDEX: 25.66 KG/M2 | HEART RATE: 64 BPM | SYSTOLIC BLOOD PRESSURE: 104 MMHG | HEIGHT: 65 IN

## 2022-11-11 DIAGNOSIS — Z91.89 AT HIGH RISK FOR BREAST CANCER: Primary | ICD-10-CM

## 2022-11-11 DIAGNOSIS — Z80.3 FAMILY HISTORY OF BREAST CANCER IN FIRST DEGREE RELATIVE: ICD-10-CM

## 2022-11-11 DIAGNOSIS — R92.30 DENSE BREAST TISSUE ON MAMMOGRAM: ICD-10-CM

## 2022-11-11 PROCEDURE — 3074F SYST BP LT 130 MM HG: CPT | Mod: CPTII,S$GLB,, | Performed by: NURSE PRACTITIONER

## 2022-11-11 PROCEDURE — 3044F HG A1C LEVEL LT 7.0%: CPT | Mod: CPTII,S$GLB,, | Performed by: NURSE PRACTITIONER

## 2022-11-11 PROCEDURE — 3078F PR MOST RECENT DIASTOLIC BLOOD PRESSURE < 80 MM HG: ICD-10-PCS | Mod: CPTII,S$GLB,, | Performed by: NURSE PRACTITIONER

## 2022-11-11 PROCEDURE — 99204 OFFICE O/P NEW MOD 45 MIN: CPT | Mod: S$GLB,,, | Performed by: NURSE PRACTITIONER

## 2022-11-11 PROCEDURE — 3044F PR MOST RECENT HEMOGLOBIN A1C LEVEL <7.0%: ICD-10-PCS | Mod: CPTII,S$GLB,, | Performed by: NURSE PRACTITIONER

## 2022-11-11 PROCEDURE — 1159F PR MEDICATION LIST DOCUMENTED IN MEDICAL RECORD: ICD-10-PCS | Mod: CPTII,S$GLB,, | Performed by: NURSE PRACTITIONER

## 2022-11-11 PROCEDURE — 3074F PR MOST RECENT SYSTOLIC BLOOD PRESSURE < 130 MM HG: ICD-10-PCS | Mod: CPTII,S$GLB,, | Performed by: NURSE PRACTITIONER

## 2022-11-11 PROCEDURE — 99204 PR OFFICE/OUTPT VISIT, NEW, LEVL IV, 45-59 MIN: ICD-10-PCS | Mod: S$GLB,,, | Performed by: NURSE PRACTITIONER

## 2022-11-11 PROCEDURE — 1159F MED LIST DOCD IN RCRD: CPT | Mod: CPTII,S$GLB,, | Performed by: NURSE PRACTITIONER

## 2022-11-11 PROCEDURE — 99999 PR PBB SHADOW E&M-EST. PATIENT-LVL IV: CPT | Mod: PBBFAC,,, | Performed by: NURSE PRACTITIONER

## 2022-11-11 PROCEDURE — 3008F BODY MASS INDEX DOCD: CPT | Mod: CPTII,S$GLB,, | Performed by: NURSE PRACTITIONER

## 2022-11-11 PROCEDURE — 3008F PR BODY MASS INDEX (BMI) DOCUMENTED: ICD-10-PCS | Mod: CPTII,S$GLB,, | Performed by: NURSE PRACTITIONER

## 2022-11-11 PROCEDURE — 3078F DIAST BP <80 MM HG: CPT | Mod: CPTII,S$GLB,, | Performed by: NURSE PRACTITIONER

## 2022-11-11 PROCEDURE — 99999 PR PBB SHADOW E&M-EST. PATIENT-LVL IV: ICD-10-PCS | Mod: PBBFAC,,, | Performed by: NURSE PRACTITIONER

## 2022-11-11 NOTE — PROGRESS NOTES
"  Reason For Consultation:   Increased lifetime risk of breast cancer    Referring Provider:   Gwen Rodríguez DO  [address] [facility] [address]  [place] [address]    Records Obtained: Records of the patients history including those obtained from the referring provider were reviewed and summarized in detail.    HPI:   Dot Doyle presents for consultation of increased risk of breast cancer. She is premenopausal. She presented for screening mammogram on 10/04/22 which was benign but revealed a Tyrer-Cuzick score of 22.76%.     Today, Feels good and no complaints.   No breast concerns.    High Risk Breast cancer specific history:  - Age: 41 y.o.   - Height:  5'5"  - Weight: 154 lbs  - Breast density per BI-RADS:  extremely dense  - Age at menarche:  10  - Number of pregnancies: ; age of first live birth: 29   - History of breast feeding: Yes - about 4 to 6 months with each child.   - Age at menopause, if applicable:  n/a premenopausal  -Uterus and ovaries intact: Yes  - HRT: No    - Genetic testing: No  - Personal history of cancer: No  - Previous chest radiation exposure between ages 10-30 years old: No  - Personal history of breast biopsy: Yes - 1, benign, no precancerous cells  - Ashkenazi Rastafari Inheritance: No  - Family history of cancer:  mom: breast cancer diagnosed at 75, now 78  Dad: leukemia,  at 47  Paternal aunt: lung cancer  Paternal uncle: lung cancer    Social History:  Tobacco use:  none   Alcohol use:  cut back recently, now a healthier level  Exercise regimen: getting back into exercising  Employment: small business    SEE CALCULATED RISK BELOW.     Past Medical   Past Medical History:   Diagnosis Date    Abnormal Pap smear of cervix     8 yrs ago    Factor 5 Leiden mutation, heterozygous     History of MTHFR mutation      Patient Active Problem List   Diagnosis    Wears contact lenses     Social History   Social History     Tobacco Use    Smoking status: Never     Passive " "exposure: Never    Smokeless tobacco: Never   Substance Use Topics    Alcohol use: Yes    Drug use: Never     Family History  Family History   Problem Relation Age of Onset    Breast cancer Mother 78    Cataracts Mother     Leukemia Father     Strabismus Daughter         strab sx @ 3 yrs old    Stroke Maternal Grandmother     Stroke Maternal Grandfather     Glaucoma Neg Hx     Macular degeneration Neg Hx      Medications    Current Outpatient Medications:     Lactobacillus acidophilus (PROBIOTIC ORAL), Take by mouth., Disp: , Rfl:     metronidazole 0.75% (METROCREAM) 0.75 % Crea, Apply topically., Disp: , Rfl:     pantoprazole (PROTONIX) 40 MG tablet, Take 40 mg by mouth every morning., Disp: , Rfl:     triamcinolone acetonide 0.1% (KENALOG) 0.1 % cream, Apply topically 2 (two) times daily. To rash x 2 weeks PRN, Disp: 454 g, Rfl: 1    Current Facility-Administered Medications:     levonorgestreL (MIRENA) 20 mcg/24 hours (8 yrs) 52 mg IUD 1 Intra Uterine Device, 1 Intra Uterine Device, Intrauterine, , Gwen Rodríguez, DO, 1 Intra Uterine Device at 10/12/22 1415  Allergies  Review of patient's allergies indicates:  No Known Allergies    Review of Systems       See above   Review of Systems  Review of Systems  All other systems reviewed and are negative.    Objective:      Vitals:   Vitals:    11/11/22 1050   BP: 104/69   BP Location: Right arm   Patient Position: Sitting   BP Method: Medium (Automatic)   Pulse: 64   SpO2: 100%   Weight: 69.9 kg (154 lb)   Height: 5' 5" (1.651 m)     BMI: Body mass index is 25.63 kg/m².   Body surface area is 1.79 meters squared.    Physical Exam  Vitals reviewed.   Constitutional:       General: She is not in acute distress.     Appearance: She is well-developed. She is not diaphoretic.   HENT:      Head: Normocephalic and atraumatic.   Eyes:      General: No scleral icterus.     Conjunctiva/sclera: Conjunctivae normal.   Cardiovascular:      Rate and Rhythm: Normal rate and " regular rhythm.      Pulses: Normal pulses.      Heart sounds: Normal heart sounds.   Pulmonary:      Effort: Pulmonary effort is normal. No respiratory distress.      Breath sounds: Normal breath sounds.   Chest:      Comments: No skin changes or breast masses noted bilaterally, no  lymphadenopathy noted    Abdominal:      General: There is no distension.   Musculoskeletal:         General: Normal range of motion.      Cervical back: Normal range of motion and neck supple.   Skin:     General: Skin is warm and dry.   Neurological:      Mental Status: She is alert and oriented to person, place, and time.   Psychiatric:         Behavior: Behavior normal.     Laboratory Data: reviewed most recent   Imaging: reviewed most recent    Assessment:     1. At high risk for breast cancer    2. Family history of breast cancer in first degree relative    3. Dense breast tissue on mammogram        1. Increased risk of breast cancer   * Tyrer-Cuzick (TC) lifetime risk of 32.8%. We discussed that TC score will categorize your lifetime risk of being diagnosed with breast cancer. Categories are as such: Average risk <15%, Intermediate risk 15-19%, and High risk > or = to 20%.    *The Melvi Model for Breast Cancer risk: She has a 2.3% 5-year breast cancer risk (Compared with 0.8% for the average 43 y.o. woman) and 22.4% Lifetime breast cancer risk (Compared with 12.1% for the average 43 y.o. woman). A woman's risk is considered low if her five-year risk of developing breast cancer is less than 1.6%; it is considered high if she scores above 1.66%. (All women who are over 60 have a score of at least 1.66 and are considered high risk, based on the Melvi Model.)    Recommendation for women with elevated TC score:     Recommendation for women with elevated Melvi Model.         Risk factors are categorized into 2 groups: Modifiable and Non-modifiable. Modifiable risk factors include use of hormones, alcohol, smoking, diet and exercise.  Non-modifiable risk factors include breast density, genetics, chest radiation, previous pregnancies, age of first period, and age of menopause.      * For women at high risk for breast cancer, endocrine therapy can reduce the risk of invasive and/or in situ breast cancers. (tamoxifen for premenopausal or postmenopausal women and raloxifene or exemestane for postmenopausal women).     * Discussed that Tamoxifen 20 mg daily for 5 years has shown to reduce risk of breast cancer by 49% and women with ADH/ALH or LCIS have an even more significant reduction of risk of 86%. Aromatase inhibitors for 5 years have also shown risk reduction in terms of 50-60%. At current, there is not adequate data to recommend longer courses of therapy more than 5 years for risk reduction.      * Tamoxifen has limited data in BRCA 1/2 mutation carriers but limited retrospective data is suggestive of benefit. There is retrospective data that aromatase inhibitors can reduce the risk of contralateral ER positive breast cancers in BRCA 1/2 patients who were taking AIs as adjuvant therapy. There is no data for raloxifen in the population.      * Reviewed risks of Tamoxifen side effects include hot flashes, invasive endometrial cancer in women > 49 years of age (2.3/1000 compared to 0.9/1000), cataracts, increased risk of pulmonary embolism among others.     * Reviewed Lifestyle modifications which have shown benefit:  Limit alcohol consumption to less than 1 drink per day (1 ounce liquor, 6 oz wine, 8 oz beer)  Avoid smoking.  Exercise at least 150 minutes per week of moderate intensity aerobic activity or at least 75 minutes of vigorous activity. Exercise can lower the relative risk of breast cancer by ~18-20%.  Maintain healthy weight and avoid post-menopausal weight gain. Avoid processed foods and eat more lean proteins, fruits and vegetables.                  * Discussed available resources including genetic counseling, nutrition, weight  management.      * Reviewed future screening:   Semiannual (every 6 months) CBE (clinical breast exam).   Annual Breast MRI alternating with an annual MMG. if TC 20% or greater.              Discussed with patient that there are several models available for stratifying breast cancer risk, and Edna-Lilliam is presently the model utilized by [facility] and is a model recommended per current NCCN guidelines.    The Melvi Model for Breast Cancer risk estimates the absolute 5 year risk and lifetime risk of developing breast cancer. Family history includes only first degree relatives with breast cancer, which is not enough information to estimate the risk of a patient having BRCA mutation. It also underestimates the cancer risk for patients with extensive family history. The Melvi Model is a good predictor of risk for populations but not for individuals. It adjusts risk for race/ethnicity. It may underestimate breast cancer risk in patients with atypical hyperplasia and strong family history. The Melvi Model was NOT designed to estimate risk for: Women with a prior diagnosis of breast cancer, lobular carcinoma in situ (LCIS), or ductal carcinoma in situ (DCIS);  Women who have received previous radiation therapy to the chest for treatment of Hodgkin lymphoma;  Women with gene mutations in BRCA1 or BRCA2, or those who are known to have certain genetic syndromes that increase risk for breast cancer; Women of age <35 or >85.    Plan:     Patient has opted out of chemoprevention but will call in the future if she wishes to pursue.   Patient elects to proceed with alternating annual mammogram and annual breast MRI along with semiannual CBEs.  May want to push MRIs to every 2 to 3 years per pt  Patient will follow up with PCP or GYN for one semiannual CBE along with annual MRI in April/May 2023 and will RTC here for one semiannual CBE along with annual breast mammogram in October/November 2023  Lifestyle modifications  as detailed above.   5.   Encouraged breast awareness, including monthly breast self-exams.   6.    Refer for Genetic counseling.     RTC in one year to see me either at [facility] or on 3rd floor..     Questions were encouraged and answered to patient's satisfaction, and patient verbalized understanding of information and agreement with the plan. Advised patient to RTC with any interval changes or concerns.      Patient is in agreement with the proposed treatment plan. All questions were answered to the patient's satisfaction. Patient knows to call clinic for any new or worsening symptoms and if anything is needed before the next clinic visit.    LORIE Ramos      Med Onc Chart Routing      Follow up with physician    Follow up with SHANDRA 1 year. November 2023   Infusion scheduling note    Injection scheduling note    Labs    Imaging MRI   April 2023   Pharmacy appointment    Other referrals           LORIE Ramos  Hematology & Medical Oncology   [address] [place] [id number]  ph. [phone]  Fax. [phone]    Total time spent with the patient: 45 minutes, 35 minutes of face to face consultation and 10 minutes of chart review and coordination of care, on the day of the visit. This includes face to face time and non-face to face time preparing to see the patient (eg, review of tests), obtaining and/or reviewing separately obtained history, documenting clinical information in the electronic or other health record, independently interpreting resultsand communicating results to the patient/family/caregiver, or care coordination.

## 2022-11-14 ENCOUNTER — TELEPHONE (OUTPATIENT)
Dept: HEMATOLOGY/ONCOLOGY | Facility: CLINIC | Age: 42
End: 2022-11-14
Payer: COMMERCIAL

## 2023-01-23 ENCOUNTER — PATIENT MESSAGE (OUTPATIENT)
Dept: HEMATOLOGY/ONCOLOGY | Facility: CLINIC | Age: 43
End: 2023-01-23
Payer: COMMERCIAL

## 2023-03-27 NOTE — PROGRESS NOTES
"Department of Hematology and Oncology  [facility]    Cancer Genetics  Initial Consult    Date of Service:  3/28/23  Visit Provider:  Paloma Murray PA-C  Collaborating Physician:  Nenita Cramer MD    Patient:  Dot Doyle  :  1980  MRN:  [id number]     Referring Provider    Kacey Bartholomew, NP  [address] [place] [address]    Televisit Information  Patient location: [place], [place].    Visit type:  audiovisual  Face-to-face time with patient: approximately 30 minutes.  Approximately 50 minutes in total were spent on this encounter, which includes face-to-face time and non-face-to-face time preparing to see the patient (e.g., review of tests), obtaining and/or reviewing separately obtained history, documenting clinical information in the electronic or other health record, independently interpreting results (not separately reported) and communicating results to the patient/family/caregiver, or care coordination (not separately reported).  Each patient to whom he or she provides medical services by telemedicine is:  (1) informed of the relationship between the physician and patient and the respective role of any other health care provider with respect to management of the patient; and (2) notified that he or she may decline to receive medical services by telemedicine and may withdraw from such care at any time.    SUBJECTIVE      Chief Complaint: Genetic evaluation  History of Present Illness (HPI):  Dot Doyle ("Andra"), 42 y.o., assigned female sex at birth, is established with the [facility] but new to me.  She presents for genetic cancer risk assessment given her family history of breast cancer in her mother.     Genetic Assessment History  Germline cancer-genetic testing: no  Personal history of cancer:  no  Benign tumors:  hx benign breast biopsy  Colon polyps: No. Last colonoscopy 1.5 yrs ago  was " normal.   Pancreatitis:  no  Chemoprevention: Never used  Uterus and ovaries intact:  yes    Past Medical History:   Diagnosis Date    Abnormal Pap smear of cervix     8 yrs ago    Factor 5 Leiden mutation, heterozygous     History of MTHFR mutation      Patient Active Problem List    Diagnosis Date Noted    Wears contact lenses 09/16/2021      Family History  Germline cancer-genetic testing in blood relatives:  No  Ashkenazi Sabianist ancestry: No  Consanguinity in ancestors:  No  No known history of cancer of colorectal polyps in extended family other than noted below.     ** If the pedigree is small/illegible, expand this note window horizontally to view the pedigree in a larger format. **      Family History   Problem Relation Age of Onset    Breast cancer Mother 75        unilatertal, no genetic tesing    Cataracts Mother     Leukemia Father     Stroke Maternal Grandmother     Stroke Maternal Grandfather     Breast cancer Paternal Aunt         after 50    Prostate cancer Paternal Uncle         80s?    Lung cancer Paternal Grandmother 60        +smoker    Glaucoma Neg Hx     Macular degeneration Neg Hx       Review of Systems  See HPI.       OBJECTIVE      Physical Exam  Limited secondary to the inherent nature of a virtual visit.  Very pleasant patient.  Constitutional       Appearance: No apparent distress.  Neurological     Mental Status: Alert and oriented.  Psychiatric        Mood and Affect: Normal     Thought Content:  Normal       Speech:  Normal     Behavior:  Normal     Judgment:  Normal  Genetics-specific     It is my assessment that the patient is ready to proceed with cancer-genetic testing from a psychosocial perspective.    COUNSELING      Cancer Genetics     Every cell in your body has a copy of your genes, which are segments of DNA that work as an instruction manual to tell the cells how to grow and divide. If a gene has a mutation (abnormality) it could send the wrong instructions to the cells,  causing them to grow and divide out of control and develop into cancer cells.     How does this happen?  Random DNA mistakes can occur as the cells multiply.   The DNA can be altered by carcinogens, such as the chemicals in tobacco smoke, UV rays from the sun, and certain infections like HPV (human papillomavirus).   Abnormal DNA can be inherited from our parents.     How are genetic mutations detected?  Germline testing: Tests the blood or saliva to detect inherited genetic mutations the person was born with. Every cell in the body (blood, saliva, skin, organs, eggs, sperm) will have the abnormal gene which can be passed on to the individual's children.    Somatic testing: Tests the cancer cells to detect acquired genetic mutations that occurred over the course of the individual's life. These abnormal genes are only present in the cancer cells and cannot be passed on to the individual's children.     Sporadic cancer  Approximately 80% of all cancers are sporadic or random. These cancers are caused by an accumulation of genetic mutations acquired over the course of an individual's lifetime. The risk for acquired mutations increases with age and can be influenced by environmental factors (chemical and radiation exposures), lifestyle factors (smoking, alcohol, obesity, lack of exercise, poor diet), and certain medical conditions (diabetes, liver disease, infections). Because cancer is common, some families will have more than one person with the same type of sporadic cancer. The most common cancers are breast, lung, colorectal and prostate. Most sporadic cancers occur after age 60.    Familial cancer  Familial cancer refers to a clustering of cancer in a family that is more than you would expect to see by chance, but it is not hereditary. While hereditary cancer is caused by an inherited mutation in a single gene, familial cancers are caused by a combination of multiple factors, some of which may be inherited (like  diabetes or fatty liver disease) and some related to shared lifestyle and environmental factors. Like sporadic cancer, familial cancers are usually diagnosed at older ages and don't follow the same inheritance patterns seen in hereditary cancers.     Hereditary cancer  Approximately 5-10% of all cancers are hereditary, meaning they are caused by an inherited mutation in a single gene, such as BRCA1. These mutations are passed directly from parent to child, with each child having a 50% chance of inheriting the mutation. Hereditary cancer is suspected in individuals with early-onset cancer (usually before age 50), rare cancers (such as male breast cancer), and/or multiple blood relatives in successive generations with the same or related cancers (for example breast, ovarian, prostate and pancreatic cancer, which are all associated with BRCA gene mutations). Germline testing will show that the relatives have the exact same genetic mutation that is known to cause cancer.         Results of Genetic Testing     Positive:  This means the individual has an inherited mutation that is known to cause cancer and/or other disease.   Each gene is associated with different cancers and different levels of risk. Most genes have specific guidelines for cancer screening and risk reduction that are different from what is done by the general population. With proper management, cancer can be prevented or caught in the early stages when it is treatable and curable.   Relatives will need testing to see if they also have the mutation. Cascade testing refers to the testing of relatives in a particular order. Siblings and parents are the first to be tested, as each has a 50% chance of having the mutation. Once it is determined which side of the family the mutation came from, testing is extended to the aunts and uncles on that side. Mutations don't skip generations, so if an individual tests negative for the familial mutation, their children  cannot have the mutation and do not need testing.    Negative:  This means the individual has no inherited mutations that are known to cause cancer and/or other disease.   If the individual has cancer, this could mean their cancer is sporadic or that they have a hereditary cancer but their inherited mutation is not detectable using current testing technology. Genetics is a rapidly evolving field. If the personal or family history is suspicious for hereditary cancer, updated testing may be recommended in the future.   If the individual does not have cancer, this is considered an uninformative negative since the results don't tell us if their relative(s) with cancer have sporadic cancers or have a genetic mutation the individual did not inherit. For this reason, the most informative individuals to have genetic testing are those with a history of cancer.   Despite the negative result, the individual may be at higher risk for the cancers in the family compared to someone with no family history of cancer. This is due to shared lifestyle, environment and other inherited risk factors.   VUS:  Individuals are born with many harmless mutations that don't affect the function of the gene. A variant of uncertain significance (VUS) is a change in a gene with insufficient evidence to determine if it is harmful or harmless.   Eventually, researchers obtain enough data on the variant to reclassify it. When this is done, the genetics lab will send a notification to the individual and the ordering provider.   Most variants (approximately 91%) are reclassified as harmless.   No actions are needed for a VUS other than checking in with the genetics lab or the genetics team periodically to see if it has been reclassified.     Genetic Discrimination     Genetic discrimination occurs when individuals are discriminated against on the basis of their genetic information.    The Genetic Information Nondiscrimination Act of 2008 (EVER) is U[place]  federal legislation that provides some protections against use of an individual's genetic information by their health insurer and by their employer.      The are two parts to EVER:  Title I and Title II.    Title I of EVER prohibits most health insurers from utilizing an individual's genetic information to make decisions regarding insurance eligibility or premium charges.      Title II of EVER prohibits covered entities, in many cases, from requesting or requiring the genetic information of employees and applicants and from using said information to make employment decisions.  This protection does not apply to employers with fewer than 15 employees or to the .    EVER does not protect individuals from genetic discrimination by any other type of policy or entity, including but not limited to life insurance, disability insurance, long-term care insurance,  benefits, and [facility] benefits.    Genetic Testing Logistics     In most cases, a blood sample is collected at an [facility] lab and sent to an outside genetics lab for testing. In some cases, a saliva sample is collected by the patient at home and mailed to the genetics lab.     There is a potential for the patient to incur out-of-pocket costs related to genetic testing.     Questions were encouraged and answered to the patient's satisfaction, and she verbalized understanding of information and agreement with the plan.       ASSESSMENT/PLAN      A cancer-genetic evaluation and pre-test genetic counseling were conducted. Based on the information provided by Andra, her personal and/or family history is not particularly suggestive of a potential hereditary predisposition to cancer and does not meet current clinical guidelines for genetic testing.  However, since she is high risk for breast cancer (elevated Tyrer-Cuzick) she may be interested in testing to understand her cancer risk. She understands that this may not be covered by insurance.  She would like to inquire with her insurance carrier before deciding. If she opts for testing, it will be done through [facility], either comprehensive or a breast-specific panel. She will let us know if she wants to proceed.       1. Encounter for nonprocreative genetic counseling    2. At high risk for breast cancer    3. Family history of breast cancer    4. Family history of prostate cancer    Follow-up:  If needed or patient desires.       REFERENCES     National Comprehensive Cancer Network (NCCN). (2021). Genetic/familial high-risk assessment: Breast, ovarian, and pancreatic. NCCN Clinical Practice Guidelines in Oncology (NCCN Guidelines), Version 1.2022.  National Comprehensive Cancer Network (NCCN). (2021). Prostate cancer. NCCN Clinical Practice Guidelines in Oncology (NCCN Guidelines), Version 1.2022.    Paloma Murray PA-C, MPAS, PA-C  Physician Assistant, Hereditary/High Risk Clinic  Hematology/Oncology, [facility]

## 2023-03-28 ENCOUNTER — PATIENT MESSAGE (OUTPATIENT)
Dept: HEMATOLOGY/ONCOLOGY | Facility: CLINIC | Age: 43
End: 2023-03-28

## 2023-03-28 ENCOUNTER — OFFICE VISIT (OUTPATIENT)
Dept: HEMATOLOGY/ONCOLOGY | Facility: CLINIC | Age: 43
End: 2023-03-28
Payer: COMMERCIAL

## 2023-03-28 DIAGNOSIS — Z91.89 AT HIGH RISK FOR BREAST CANCER: ICD-10-CM

## 2023-03-28 DIAGNOSIS — Z80.3 FAMILY HISTORY OF BREAST CANCER: ICD-10-CM

## 2023-03-28 DIAGNOSIS — Z71.83 ENCOUNTER FOR NONPROCREATIVE GENETIC COUNSELING: Primary | ICD-10-CM

## 2023-03-28 DIAGNOSIS — Z80.42 FAMILY HISTORY OF PROSTATE CANCER: ICD-10-CM

## 2023-03-28 PROBLEM — Z97.3 WEARS CONTACT LENSES: Status: RESOLVED | Noted: 2021-09-16 | Resolved: 2023-03-28

## 2023-03-28 PROCEDURE — 1159F MED LIST DOCD IN RCRD: CPT | Mod: CPTII,95,, | Performed by: PHYSICIAN ASSISTANT

## 2023-03-28 PROCEDURE — 1160F PR REVIEW ALL MEDS BY PRESCRIBER/CLIN PHARMACIST DOCUMENTED: ICD-10-PCS | Mod: CPTII,95,, | Performed by: PHYSICIAN ASSISTANT

## 2023-03-28 PROCEDURE — 99215 OFFICE O/P EST HI 40 MIN: CPT | Mod: 95,,, | Performed by: PHYSICIAN ASSISTANT

## 2023-03-28 PROCEDURE — 1160F RVW MEDS BY RX/DR IN RCRD: CPT | Mod: CPTII,95,, | Performed by: PHYSICIAN ASSISTANT

## 2023-03-28 PROCEDURE — 1159F PR MEDICATION LIST DOCUMENTED IN MEDICAL RECORD: ICD-10-PCS | Mod: CPTII,95,, | Performed by: PHYSICIAN ASSISTANT

## 2023-03-28 PROCEDURE — 99215 PR OFFICE/OUTPT VISIT, EST, LEVL V, 40-54 MIN: ICD-10-PCS | Mod: 95,,, | Performed by: PHYSICIAN ASSISTANT

## 2023-04-17 ENCOUNTER — PATIENT MESSAGE (OUTPATIENT)
Dept: HEMATOLOGY/ONCOLOGY | Facility: CLINIC | Age: 43
End: 2023-04-17
Payer: COMMERCIAL

## 2023-04-21 ENCOUNTER — PATIENT MESSAGE (OUTPATIENT)
Dept: HEMATOLOGY/ONCOLOGY | Facility: CLINIC | Age: 43
End: 2023-04-21
Payer: COMMERCIAL

## 2023-05-09 ENCOUNTER — PATIENT MESSAGE (OUTPATIENT)
Dept: HEMATOLOGY/ONCOLOGY | Facility: CLINIC | Age: 43
End: 2023-05-09
Payer: COMMERCIAL

## 2023-05-09 ENCOUNTER — HOSPITAL ENCOUNTER (OUTPATIENT)
Dept: RADIOLOGY | Facility: HOSPITAL | Age: 43
Discharge: HOME | End: 2023-05-09
Attending: NURSE PRACTITIONER
Payer: COMMERCIAL

## 2023-05-09 DIAGNOSIS — Z91.89 AT HIGH RISK FOR BREAST CANCER: ICD-10-CM

## 2023-05-09 DIAGNOSIS — R92.30 DENSE BREAST TISSUE ON MAMMOGRAM: ICD-10-CM

## 2023-05-09 DIAGNOSIS — Z80.3 FAMILY HISTORY OF BREAST CANCER IN FIRST DEGREE RELATIVE: ICD-10-CM

## 2023-05-09 PROCEDURE — 77049 MRI BREAST W/WO CONTRAST, W/CAD, BILATERAL: ICD-10-PCS | Mod: 26,,, | Performed by: RADIOLOGY

## 2023-05-09 PROCEDURE — A9577 INJ MULTIHANCE: HCPCS | Performed by: NURSE PRACTITIONER

## 2023-05-09 PROCEDURE — 77049 MRI BREAST C-+ W/CAD BI: CPT | Mod: TC

## 2023-05-09 PROCEDURE — 25500020 PHARM REV CODE 255: Performed by: NURSE PRACTITIONER

## 2023-05-09 PROCEDURE — 77049 MRI BREAST C-+ W/CAD BI: CPT | Mod: 26,,, | Performed by: RADIOLOGY

## 2023-05-09 RX ADMIN — GADOBENATE DIMEGLUMINE 15 ML: 529 INJECTION, SOLUTION INTRAVENOUS at 03:05

## 2023-09-26 ENCOUNTER — TELEPHONE (OUTPATIENT)
Dept: OPHTHALMOLOGY | Facility: CLINIC | Age: 43
End: 2023-09-26
Payer: COMMERCIAL

## 2023-09-26 NOTE — TELEPHONE ENCOUNTER
Forward pt message to optometry to be schedule.     ----- Message from Vita Cid sent at 9/22/2023  5:00 PM CDT -----    ----- Message -----  From: Adilia Mae MD  Sent: 9/22/2023   4:25 PM CDT  To: Tu PUENTES Staff    This is a close friend of mine - she was seeing [facility] for CL. Needs to see optom for new Rx . Pls schedule appt with [place] or sandra or ?

## 2023-09-27 ENCOUNTER — TELEPHONE (OUTPATIENT)
Dept: OPTOMETRY | Facility: CLINIC | Age: 43
End: 2023-09-27
Payer: COMMERCIAL

## 2023-09-27 NOTE — TELEPHONE ENCOUNTER
----- Message from Annette Coon sent at 9/26/2023  4:57 PM CDT -----  Good afternoon,    Pt needs an appointment  for contact lens can you guys schedule her.    Thank you!  Annette BALTAZAR    ----- Message -----  From: Vita Cid  Sent: 9/22/2023   5:00 PM CDT  To: Cher Francisco MA; Annette Coon      ----- Message -----  From: Adilia Mae MD  Sent: 9/22/2023   4:25 PM CDT  To: Tu PU[facility]ES Staff    This is a close friend of mine - she was seeing [facility] for CL. Needs to see optom for new Rx . Pls schedule appt with [place] or sandra or ?

## 2023-10-17 ENCOUNTER — PATIENT MESSAGE (OUTPATIENT)
Dept: OBSTETRICS AND GYNECOLOGY | Facility: CLINIC | Age: 43
End: 2023-10-17
Payer: COMMERCIAL

## 2024-01-09 ENCOUNTER — PATIENT MESSAGE (OUTPATIENT)
Dept: OBSTETRICS AND GYNECOLOGY | Facility: CLINIC | Age: 44
End: 2024-01-09

## 2024-01-09 ENCOUNTER — OFFICE VISIT (OUTPATIENT)
Dept: OBSTETRICS AND GYNECOLOGY | Facility: CLINIC | Age: 44
End: 2024-01-09
Attending: OBSTETRICS & GYNECOLOGY
Payer: COMMERCIAL

## 2024-01-09 ENCOUNTER — LAB VISIT (OUTPATIENT)
Dept: LAB | Facility: OTHER | Age: 44
End: 2024-01-09
Attending: OBSTETRICS & GYNECOLOGY
Payer: COMMERCIAL

## 2024-01-09 VITALS
BODY MASS INDEX: 27.18 KG/M2 | WEIGHT: 163.13 LBS | HEIGHT: 65 IN | DIASTOLIC BLOOD PRESSURE: 60 MMHG | SYSTOLIC BLOOD PRESSURE: 108 MMHG

## 2024-01-09 DIAGNOSIS — Z91.89 INCREASED RISK OF BREAST CANCER: ICD-10-CM

## 2024-01-09 DIAGNOSIS — Z12.4 CERVICAL CANCER SCREENING: ICD-10-CM

## 2024-01-09 DIAGNOSIS — Z30.431 IUD CHECK UP: ICD-10-CM

## 2024-01-09 DIAGNOSIS — Z01.419 WELL WOMAN EXAM WITH ROUTINE GYNECOLOGICAL EXAM: ICD-10-CM

## 2024-01-09 DIAGNOSIS — Z01.419 WELL WOMAN EXAM WITH ROUTINE GYNECOLOGICAL EXAM: Primary | ICD-10-CM

## 2024-01-09 DIAGNOSIS — Z12.11 COLON CANCER SCREENING: ICD-10-CM

## 2024-01-09 LAB
25(OH)D3+25(OH)D2 SERPL-MCNC: 43 NG/ML (ref 30–96)
ALBUMIN SERPL BCP-MCNC: 4.7 G/DL (ref 3.5–5.2)
ALP SERPL-CCNC: 59 U/L (ref 55–135)
ALT SERPL W/O P-5'-P-CCNC: 7 U/L (ref 10–44)
ANION GAP SERPL CALC-SCNC: 12 MMOL/L (ref 8–16)
AST SERPL-CCNC: 16 U/L (ref 10–40)
BASOPHILS # BLD AUTO: 0.04 K/UL (ref 0–0.2)
BASOPHILS NFR BLD: 0.8 % (ref 0–1.9)
BILIRUB SERPL-MCNC: 0.6 MG/DL (ref 0.1–1)
BUN SERPL-MCNC: 12 MG/DL (ref 6–20)
CALCIUM SERPL-MCNC: 9.8 MG/DL (ref 8.7–10.5)
CHLORIDE SERPL-SCNC: 102 MMOL/L (ref 95–110)
CO2 SERPL-SCNC: 23 MMOL/L (ref 23–29)
CREAT SERPL-MCNC: 0.9 MG/DL (ref 0.5–1.4)
DIFFERENTIAL METHOD BLD: ABNORMAL
EOSINOPHIL # BLD AUTO: 0 K/UL (ref 0–0.5)
EOSINOPHIL NFR BLD: 0.6 % (ref 0–8)
ERYTHROCYTE [DISTWIDTH] IN BLOOD BY AUTOMATED COUNT: 12.3 % (ref 11.5–14.5)
EST. GFR  (NO RACE VARIABLE): >60 ML/MIN/1.73 M^2
ESTIMATED AVG GLUCOSE: 103 MG/DL (ref 68–131)
ESTRADIOL SERPL-MCNC: 419 PG/ML
FSH SERPL-ACNC: 5.7 MIU/ML
GLUCOSE SERPL-MCNC: 98 MG/DL (ref 70–110)
HBA1C MFR BLD: 5.2 % (ref 4–5.6)
HCT VFR BLD AUTO: 41.1 % (ref 37–48.5)
HGB BLD-MCNC: 13.6 G/DL (ref 12–16)
IMM GRANULOCYTES # BLD AUTO: 0.01 K/UL (ref 0–0.04)
IMM GRANULOCYTES NFR BLD AUTO: 0.2 % (ref 0–0.5)
LYMPHOCYTES # BLD AUTO: 1.6 K/UL (ref 1–4.8)
LYMPHOCYTES NFR BLD: 32.4 % (ref 18–48)
MCH RBC QN AUTO: 31.9 PG (ref 27–31)
MCHC RBC AUTO-ENTMCNC: 33.1 G/DL (ref 32–36)
MCV RBC AUTO: 96 FL (ref 82–98)
MONOCYTES # BLD AUTO: 0.3 K/UL (ref 0.3–1)
MONOCYTES NFR BLD: 6.8 % (ref 4–15)
NEUTROPHILS # BLD AUTO: 2.9 K/UL (ref 1.8–7.7)
NEUTROPHILS NFR BLD: 59.2 % (ref 38–73)
NRBC BLD-RTO: 0 /100 WBC
PLATELET # BLD AUTO: 261 K/UL (ref 150–450)
PMV BLD AUTO: 9.1 FL (ref 9.2–12.9)
POTASSIUM SERPL-SCNC: 4.1 MMOL/L (ref 3.5–5.1)
PROT SERPL-MCNC: 7.8 G/DL (ref 6–8.4)
RBC # BLD AUTO: 4.27 M/UL (ref 4–5.4)
SODIUM SERPL-SCNC: 137 MMOL/L (ref 136–145)
T4 FREE SERPL-MCNC: 1 NG/DL (ref 0.71–1.51)
TSH SERPL DL<=0.005 MIU/L-ACNC: 1.89 UIU/ML (ref 0.4–4)
WBC # BLD AUTO: 4.97 K/UL (ref 3.9–12.7)

## 2024-01-09 PROCEDURE — 3074F SYST BP LT 130 MM HG: CPT | Mod: CPTII,S$GLB,, | Performed by: OBSTETRICS & GYNECOLOGY

## 2024-01-09 PROCEDURE — 88175 CYTOPATH C/V AUTO FLUID REDO: CPT | Performed by: OBSTETRICS & GYNECOLOGY

## 2024-01-09 PROCEDURE — 36415 COLL VENOUS BLD VENIPUNCTURE: CPT | Performed by: OBSTETRICS & GYNECOLOGY

## 2024-01-09 PROCEDURE — 87624 HPV HI-RISK TYP POOLED RSLT: CPT | Performed by: OBSTETRICS & GYNECOLOGY

## 2024-01-09 PROCEDURE — 83036 HEMOGLOBIN GLYCOSYLATED A1C: CPT | Performed by: OBSTETRICS & GYNECOLOGY

## 2024-01-09 PROCEDURE — 3078F DIAST BP <80 MM HG: CPT | Mod: CPTII,S$GLB,, | Performed by: OBSTETRICS & GYNECOLOGY

## 2024-01-09 PROCEDURE — 99999 PR PBB SHADOW E&M-EST. PATIENT-LVL IV: CPT | Mod: PBBFAC,,, | Performed by: OBSTETRICS & GYNECOLOGY

## 2024-01-09 PROCEDURE — 82670 ASSAY OF TOTAL ESTRADIOL: CPT | Performed by: OBSTETRICS & GYNECOLOGY

## 2024-01-09 PROCEDURE — 83001 ASSAY OF GONADOTROPIN (FSH): CPT | Performed by: OBSTETRICS & GYNECOLOGY

## 2024-01-09 PROCEDURE — 84443 ASSAY THYROID STIM HORMONE: CPT | Performed by: OBSTETRICS & GYNECOLOGY

## 2024-01-09 PROCEDURE — 80053 COMPREHEN METABOLIC PANEL: CPT | Performed by: OBSTETRICS & GYNECOLOGY

## 2024-01-09 PROCEDURE — 82306 VITAMIN D 25 HYDROXY: CPT | Performed by: OBSTETRICS & GYNECOLOGY

## 2024-01-09 PROCEDURE — 84439 ASSAY OF FREE THYROXINE: CPT | Performed by: OBSTETRICS & GYNECOLOGY

## 2024-01-09 PROCEDURE — 99396 PREV VISIT EST AGE 40-64: CPT | Mod: S$GLB,,, | Performed by: OBSTETRICS & GYNECOLOGY

## 2024-01-09 PROCEDURE — 85025 COMPLETE CBC W/AUTO DIFF WBC: CPT | Performed by: OBSTETRICS & GYNECOLOGY

## 2024-01-09 PROCEDURE — 3008F BODY MASS INDEX DOCD: CPT | Mod: CPTII,S$GLB,, | Performed by: OBSTETRICS & GYNECOLOGY

## 2024-01-09 PROCEDURE — 1159F MED LIST DOCD IN RCRD: CPT | Mod: CPTII,S$GLB,, | Performed by: OBSTETRICS & GYNECOLOGY

## 2024-01-09 RX ORDER — DOXYCYCLINE HYCLATE 60 MG/1
1 TABLET, DELAYED RELEASE ORAL
COMMUNITY
Start: 2023-10-13

## 2024-01-09 RX ORDER — PIMECROLIMUS 10 MG/G
1 CREAM TOPICAL 2 TIMES DAILY
COMMUNITY
Start: 2023-10-23

## 2024-01-09 NOTE — PROGRESS NOTES
"  CC: Well woman exam    Dot Doyle is a 43 y.o. female  presents for well woman exam.  LMP: No LMP recorded (lmp unknown)..  No issues, problems, or complaints.  Mirena placed in , some improvement, continues to have monthly periods.  Regular exercise, doing weight lifting. Still some frustrations regarding weight, though feeling better with diet changes.   Due for pap this year.  High risk per T-C score, contrast mmg ordered.  Due for colonoscopy.    Past Medical History:   Diagnosis Date    Abnormal Pap smear of cervix     8 yrs ago    Factor 5 Leiden mutation, heterozygous     MTHFR mutation      Past Surgical History:   Procedure Laterality Date    BREAST BIOPSY      benign milk duct, fibrous change    DIAGNOSTIC LAPAROSCOPY      HERNIA REPAIR  2019    TONSILLECTOMY       Social History     Socioeconomic History    Marital status:    Occupational History     Comment: self employed- [employer]   Tobacco Use    Smoking status: Never     Passive exposure: Never    Smokeless tobacco: Never   Substance and Sexual Activity    Alcohol use: Yes    Drug use: Never    Sexual activity: Yes     Partners: Male     Birth control/protection: I.U.D.     Comment: Paragard placed 2018   Social History Narrative    ** Merged History Encounter **          Family History   Problem Relation Age of Onset    Breast cancer Mother 75        unilatertal, no genetic tesing    Cataracts Mother     Leukemia Father     Stroke Maternal Grandmother     Stroke Maternal Grandfather     Breast cancer Paternal Aunt         after 50    Prostate cancer Paternal Uncle         80s?    Lung cancer Paternal Grandmother 60        +smoker    Glaucoma Neg Hx     Macular degeneration Neg Hx      OB History          6    Para   3    Term   3       0    AB   3    Living   3         SAB   3    IAB   0    Ectopic   0    Multiple   0    Live Births   3                 /60   Ht 5' 5" (1.651 m)   Wt 74 kg (163 lb " 2.3 oz)   LMP  (LMP Unknown)   BMI 27.15 kg/m²       ROS:  GENERAL: Denies weight gain or weight loss. Feeling well overall.   SKIN: Denies rash or lesions.   HEAD: Denies head injury or headache.   NODES: Denies enlarged lymph nodes.   CHEST: Denies chest pain or shortness of breath.   CARDIOVASCULAR: Denies palpitations or left sided chest pain.   ABDOMEN: No abdominal pain, constipation, diarrhea, nausea, vomiting or rectal bleeding.   URINARY: No frequency, dysuria, hematuria, or burning on urination.  REPRODUCTIVE: See HPI.   BREASTS: The patient performs breast self-examination and denies pain, lumps, or nipple discharge.   HEMATOLOGIC: No easy bruisability or excessive bleeding.   MUSCULOSKELETAL: Denies joint pain or swelling.   NEUROLOGIC: Denies syncope or weakness.   PSYCHIATRIC: Denies depression, anxiety or mood swings.    PHYSICAL EXAM:  APPEARANCE: Well nourished, well developed, in no acute distress.  AFFECT: WNL, alert and oriented x 3  SKIN: No acne or hirsutism  NECK: Neck symmetric without masses or thyromegaly  NODES: No inguinal, cervical, axillary, or femoral lymph node enlargement  CHEST: Good respiratory effect  ABDOMEN: Soft.  No tenderness or masses.  No hepatosplenomegaly.  No hernias.  BREASTS: Symmetrical, no skin changes or visible lesions.  No palpable masses, nipple discharge bilaterally.  PELVIC: Normal external genitalia without lesions.  Normal hair distribution.  Adequate perineal body, normal urethral meatus.  Vagina moist and well rugated without lesions or discharge.  Cervix pink, without lesions, discharge or tenderness.  IUD strings seen. No significant cystocele or rectocele.  Bimanual exam shows uterus to be normal size, regular, mobile and nontender.  Adnexa without masses or tenderness.    EXTREMITIES: No edema.    Well woman exam with routine gynecological exam  -     CBC Auto Differential; Future; Expected date: 01/09/2024  -     Comprehensive Metabolic Panel;  Future; Expected date: 01/09/2024  -     Hemoglobin A1C; Future; Expected date: 01/09/2024  -     TSH; Future; Expected date: 01/09/2024  -     T4, Free; Future; Expected date: 01/09/2024  -     Vitamin D; Future; Expected date: 01/09/2024  -     Follicle Stimulating Hormone; Future; Expected date: 01/09/2024  -     Estradiol; Future; Expected date: 01/09/2024    IUD check up    Increased risk of breast cancer  -     Mammo Digital Screening with Contrast, Bilateral; Future; Expected date: 01/09/2024    Cervical cancer screening  -     HPV High Risk Genotypes, PCR  -     Liquid-Based Pap Smear, Screening    Colon cancer screening  -     Ambulatory referral/consult to Endo Procedure ; Future; Expected date: 01/10/2024            Patient was counseled today on A.C.S. Pap guidelines and recommendations for yearly pelvic exams, mammograms and monthly self breast exams; to see her PCP for other health maintenance.     No follow-ups on file.

## 2024-01-11 LAB
HPV HR 12 DNA SPEC QL NAA+PROBE: NEGATIVE
HPV16 AG SPEC QL: NEGATIVE
HPV18 DNA SPEC QL NAA+PROBE: NEGATIVE

## 2024-01-14 LAB
FINAL PATHOLOGIC DIAGNOSIS: NORMAL
Lab: NORMAL

## 2024-03-05 ENCOUNTER — OFFICE VISIT (OUTPATIENT)
Dept: DERMATOLOGY | Facility: CLINIC | Age: 44
End: 2024-03-05
Payer: COMMERCIAL

## 2024-03-05 DIAGNOSIS — L71.0 PERIORIFICIAL DERMATITIS: Primary | ICD-10-CM

## 2024-03-05 DIAGNOSIS — L70.9 ACNE, UNSPECIFIED ACNE TYPE: ICD-10-CM

## 2024-03-05 PROCEDURE — 99214 OFFICE O/P EST MOD 30 MIN: CPT | Mod: S$GLB,,, | Performed by: STUDENT IN AN ORGANIZED HEALTH CARE EDUCATION/TRAINING PROGRAM

## 2024-03-05 PROCEDURE — 99999 PR PBB SHADOW E&M-EST. PATIENT-LVL III: CPT | Mod: PBBFAC,,, | Performed by: STUDENT IN AN ORGANIZED HEALTH CARE EDUCATION/TRAINING PROGRAM

## 2024-03-05 PROCEDURE — 1159F MED LIST DOCD IN RCRD: CPT | Mod: CPTII,S$GLB,, | Performed by: STUDENT IN AN ORGANIZED HEALTH CARE EDUCATION/TRAINING PROGRAM

## 2024-03-05 PROCEDURE — 1160F RVW MEDS BY RX/DR IN RCRD: CPT | Mod: CPTII,S$GLB,, | Performed by: STUDENT IN AN ORGANIZED HEALTH CARE EDUCATION/TRAINING PROGRAM

## 2024-03-05 PROCEDURE — 3044F HG A1C LEVEL LT 7.0%: CPT | Mod: CPTII,S$GLB,, | Performed by: STUDENT IN AN ORGANIZED HEALTH CARE EDUCATION/TRAINING PROGRAM

## 2024-03-05 RX ORDER — MINOCYCLINE 40 MG/G
1 AEROSOL, FOAM TOPICAL DAILY
Qty: 30 G | Refills: 5 | Status: SHIPPED | OUTPATIENT
Start: 2024-03-05

## 2024-03-05 RX ORDER — AZELAIC ACID 0.15 G/G
GEL TOPICAL
Qty: 50 G | Refills: 3 | Status: SHIPPED | OUTPATIENT
Start: 2024-03-05 | End: 2025-03-05

## 2024-03-05 RX ORDER — METRONIDAZOLE 10 MG/G
GEL TOPICAL 2 TIMES DAILY
Qty: 60 G | Refills: 5 | Status: SHIPPED | OUTPATIENT
Start: 2024-03-05 | End: 2025-03-05

## 2024-03-05 NOTE — PROGRESS NOTES
Subjective:      Patient ID:  Dot Doyle is a 43 y.o. female who presents for   Chief Complaint   Patient presents with    Dermatitis     Perioral, infraorbital     Dermatitis - Initial  Affected locations: right eye (periorbital)  Signs / symptoms: redness and burning  Timing: intermittent  Treatments tried: pimecrolimus, metronidazole.      Has dealt with perioral dermatitis for many years. Uses elidel and metro gel that usually helps. Appeared around her eye for the first time aobut 1 month ago. She was unsure if she could use the elidel cream around her eye--she used the metro gel it has helped slightly  but then recurred    She has also used doxycycline in the past which helped but then it recurred when she stopped and she does not want to continue oral antibiotics    Review of Systems    Objective:   Physical Exam   Skin:   Areas Examined (abnormalities noted in diagram):   Head / Face Inspection Performed            Diagram Legend     Erythematous scaling macule/papule c/w actinic keratosis       Vascular papule c/w angioma      Pigmented verrucoid papule/plaque c/w seborrheic keratosis      Yellow umbilicated papule c/w sebaceous hyperplasia      Irregularly shaped tan macule c/w lentigo     1-2 mm smooth white papules consistent with Milia      Movable subcutaneous cyst with punctum c/w epidermal inclusion cyst      Subcutaneous movable cyst c/w pilar cyst      Firm pink to brown papule c/w dermatofibroma      Pedunculated fleshy papule(s) c/w skin tag(s)      Evenly pigmented macule c/w junctional nevus     Mildly variegated pigmented, slightly irregular-bordered macule c/w mildly atypical nevus      Flesh colored to evenly pigmented papule c/w intradermal nevus       Pink pearly papule/plaque c/w basal cell carcinoma      Erythematous hyperkeratotic cursted plaque c/w SCC      Surgical scar with no sign of skin cancer recurrence      Open and closed comedones      Inflammatory papules and pustules       Verrucoid papule consistent consistent with wart     Erythematous eczematous patches and plaques     Dystrophic onycholytic nail with subungual debris c/w onychomycosis     Umbilicated papule    Erythematous-base heme-crusted tan verrucoid plaque consistent with inflamed seborrheic keratosis     Erythematous Silvery Scaling Plaque c/w Psoriasis     See annotation      Assessment / Plan:        Periorificial dermatitis-Chronic condition, not at goal  - c/w elidel bid PRN  - increase strength of metronidazole from 0.75% to 1% bid  - Start azelaic acid  - can trial topical minocycline, though it is off label given prior response to po doxy to avoid prolonged use of po antibiotics    -     azelaic acid (AZELEX) 15 % gel; Use to  perioral dermatitis  Dispense: 50 g; Refill: 3  -     metronidazole 1% (METROGEL) 1 % Gel; Apply topically 2 (two) times daily. Apply to perioral dermatitis  Dispense: 60 g; Refill: 5  -     minocycline (AMZEEQ) 4 % Foam; Apply 1 Application topically once daily.  Dispense: 30 g; Refill: 5    Acne, unspecified acne type  -     minocycline (AMZEEQ) 4 % Foam; Apply 1 Application topically once daily.  Dispense: 30 g; Refill: 5             Follow up if symptoms worsen or fail to improve.

## 2024-03-06 ENCOUNTER — PATIENT MESSAGE (OUTPATIENT)
Dept: DERMATOLOGY | Facility: CLINIC | Age: 44
End: 2024-03-06
Payer: COMMERCIAL

## 2024-03-11 ENCOUNTER — PATIENT MESSAGE (OUTPATIENT)
Dept: INTERNAL MEDICINE | Facility: CLINIC | Age: 44
End: 2024-03-11

## 2024-03-11 ENCOUNTER — OFFICE VISIT (OUTPATIENT)
Dept: INTERNAL MEDICINE | Facility: CLINIC | Age: 44
End: 2024-03-11
Payer: COMMERCIAL

## 2024-03-11 VITALS
SYSTOLIC BLOOD PRESSURE: 112 MMHG | RESPIRATION RATE: 18 BRPM | HEART RATE: 84 BPM | TEMPERATURE: 98 F | OXYGEN SATURATION: 98 % | BODY MASS INDEX: 27.56 KG/M2 | HEIGHT: 65 IN | DIASTOLIC BLOOD PRESSURE: 68 MMHG | WEIGHT: 165.38 LBS

## 2024-03-11 DIAGNOSIS — R50.9 FEVER, UNSPECIFIED FEVER CAUSE: ICD-10-CM

## 2024-03-11 DIAGNOSIS — R05.9 COUGH, UNSPECIFIED TYPE: Primary | ICD-10-CM

## 2024-03-11 LAB — SARS-COV-2 RNA RESP QL NAA+PROBE: NOT DETECTED

## 2024-03-11 PROCEDURE — 1159F MED LIST DOCD IN RCRD: CPT | Mod: CPTII,S$GLB,, | Performed by: INTERNAL MEDICINE

## 2024-03-11 PROCEDURE — 3044F HG A1C LEVEL LT 7.0%: CPT | Mod: CPTII,S$GLB,, | Performed by: INTERNAL MEDICINE

## 2024-03-11 PROCEDURE — 3078F DIAST BP <80 MM HG: CPT | Mod: CPTII,S$GLB,, | Performed by: INTERNAL MEDICINE

## 2024-03-11 PROCEDURE — 99999 PR PBB SHADOW E&M-EST. PATIENT-LVL IV: CPT | Mod: PBBFAC,,, | Performed by: INTERNAL MEDICINE

## 2024-03-11 PROCEDURE — 87635 SARS-COV-2 COVID-19 AMP PRB: CPT | Performed by: INTERNAL MEDICINE

## 2024-03-11 PROCEDURE — 1160F RVW MEDS BY RX/DR IN RCRD: CPT | Mod: CPTII,S$GLB,, | Performed by: INTERNAL MEDICINE

## 2024-03-11 PROCEDURE — 99213 OFFICE O/P EST LOW 20 MIN: CPT | Mod: S$GLB,,, | Performed by: INTERNAL MEDICINE

## 2024-03-11 PROCEDURE — 87804 INFLUENZA ASSAY W/OPTIC: CPT | Mod: QW,S$GLB,, | Performed by: INTERNAL MEDICINE

## 2024-03-11 PROCEDURE — 3008F BODY MASS INDEX DOCD: CPT | Mod: CPTII,S$GLB,, | Performed by: INTERNAL MEDICINE

## 2024-03-11 PROCEDURE — 3074F SYST BP LT 130 MM HG: CPT | Mod: CPTII,S$GLB,, | Performed by: INTERNAL MEDICINE

## 2024-03-12 LAB
CTP QC/QA: YES
FLUAV AG NPH QL: NEGATIVE
FLUBV AG NPH QL: NEGATIVE

## 2024-03-13 RX ORDER — PROMETHAZINE HYDROCHLORIDE AND DEXTROMETHORPHAN HYDROBROMIDE 6.25; 15 MG/5ML; MG/5ML
5 SYRUP ORAL EVERY 8 HOURS PRN
Qty: 180 ML | Refills: 0 | Status: SHIPPED | OUTPATIENT
Start: 2024-03-13 | End: 2024-03-23

## 2024-04-22 ENCOUNTER — PATIENT OUTREACH (OUTPATIENT)
Dept: ADMINISTRATIVE | Facility: HOSPITAL | Age: 44
End: 2024-04-22
Payer: COMMERCIAL

## 2024-04-22 ENCOUNTER — LAB VISIT (OUTPATIENT)
Dept: LAB | Facility: OTHER | Age: 44
End: 2024-04-22
Attending: FAMILY MEDICINE
Payer: COMMERCIAL

## 2024-04-22 ENCOUNTER — OFFICE VISIT (OUTPATIENT)
Dept: INTERNAL MEDICINE | Facility: CLINIC | Age: 44
End: 2024-04-22
Payer: COMMERCIAL

## 2024-04-22 VITALS
HEIGHT: 65 IN | DIASTOLIC BLOOD PRESSURE: 65 MMHG | BODY MASS INDEX: 27.03 KG/M2 | OXYGEN SATURATION: 98 % | SYSTOLIC BLOOD PRESSURE: 124 MMHG | HEART RATE: 72 BPM | WEIGHT: 162.25 LBS

## 2024-04-22 DIAGNOSIS — Z00.00 ROUTINE HEALTH MAINTENANCE: ICD-10-CM

## 2024-04-22 DIAGNOSIS — Z00.00 ROUTINE HEALTH MAINTENANCE: Primary | ICD-10-CM

## 2024-04-22 LAB
BASOPHILS # BLD AUTO: 0.04 K/UL (ref 0–0.2)
BASOPHILS NFR BLD: 0.8 % (ref 0–1.9)
CHOLEST SERPL-MCNC: 154 MG/DL (ref 120–199)
CHOLEST/HDLC SERPL: 2.7 {RATIO} (ref 2–5)
DIFFERENTIAL METHOD BLD: ABNORMAL
EOSINOPHIL # BLD AUTO: 0 K/UL (ref 0–0.5)
EOSINOPHIL NFR BLD: 0.8 % (ref 0–8)
ERYTHROCYTE [DISTWIDTH] IN BLOOD BY AUTOMATED COUNT: 12.4 % (ref 11.5–14.5)
ESTRADIOL SERPL-MCNC: 127 PG/ML
FSH SERPL-ACNC: 4.75 MIU/ML
HCG INTACT+B SERPL-ACNC: <1.2 MIU/ML
HCT VFR BLD AUTO: 41.8 % (ref 37–48.5)
HCV AB SERPL QL IA: NEGATIVE
HDLC SERPL-MCNC: 57 MG/DL (ref 40–75)
HDLC SERPL: 37 % (ref 20–50)
HGB BLD-MCNC: 13.7 G/DL (ref 12–16)
HIV 1+2 AB+HIV1 P24 AG SERPL QL IA: NEGATIVE
IMM GRANULOCYTES # BLD AUTO: 0.02 K/UL (ref 0–0.04)
IMM GRANULOCYTES NFR BLD AUTO: 0.4 % (ref 0–0.5)
LDLC SERPL CALC-MCNC: 86.4 MG/DL (ref 63–159)
LYMPHOCYTES # BLD AUTO: 2 K/UL (ref 1–4.8)
LYMPHOCYTES NFR BLD: 39.2 % (ref 18–48)
MCH RBC QN AUTO: 31.9 PG (ref 27–31)
MCHC RBC AUTO-ENTMCNC: 32.8 G/DL (ref 32–36)
MCV RBC AUTO: 97 FL (ref 82–98)
MONOCYTES # BLD AUTO: 0.3 K/UL (ref 0.3–1)
MONOCYTES NFR BLD: 6.6 % (ref 4–15)
NEUTROPHILS # BLD AUTO: 2.7 K/UL (ref 1.8–7.7)
NEUTROPHILS NFR BLD: 52.2 % (ref 38–73)
NONHDLC SERPL-MCNC: 97 MG/DL
NRBC BLD-RTO: 0 /100 WBC
PLATELET # BLD AUTO: 256 K/UL (ref 150–450)
PMV BLD AUTO: 8.7 FL (ref 9.2–12.9)
RBC # BLD AUTO: 4.3 M/UL (ref 4–5.4)
TRIGL SERPL-MCNC: 53 MG/DL (ref 30–150)
WBC # BLD AUTO: 5.15 K/UL (ref 3.9–12.7)

## 2024-04-22 PROCEDURE — 1159F MED LIST DOCD IN RCRD: CPT | Mod: CPTII,S$GLB,, | Performed by: FAMILY MEDICINE

## 2024-04-22 PROCEDURE — 3008F BODY MASS INDEX DOCD: CPT | Mod: CPTII,S$GLB,, | Performed by: FAMILY MEDICINE

## 2024-04-22 PROCEDURE — 36415 COLL VENOUS BLD VENIPUNCTURE: CPT | Performed by: FAMILY MEDICINE

## 2024-04-22 PROCEDURE — 80061 LIPID PANEL: CPT | Performed by: FAMILY MEDICINE

## 2024-04-22 PROCEDURE — 3044F HG A1C LEVEL LT 7.0%: CPT | Mod: CPTII,S$GLB,, | Performed by: FAMILY MEDICINE

## 2024-04-22 PROCEDURE — 3074F SYST BP LT 130 MM HG: CPT | Mod: CPTII,S$GLB,, | Performed by: FAMILY MEDICINE

## 2024-04-22 PROCEDURE — 3078F DIAST BP <80 MM HG: CPT | Mod: CPTII,S$GLB,, | Performed by: FAMILY MEDICINE

## 2024-04-22 PROCEDURE — 99396 PREV VISIT EST AGE 40-64: CPT | Mod: S$GLB,,, | Performed by: FAMILY MEDICINE

## 2024-04-22 PROCEDURE — 87389 HIV-1 AG W/HIV-1&-2 AB AG IA: CPT | Performed by: FAMILY MEDICINE

## 2024-04-22 PROCEDURE — 83001 ASSAY OF GONADOTROPIN (FSH): CPT | Performed by: FAMILY MEDICINE

## 2024-04-22 PROCEDURE — 84702 CHORIONIC GONADOTROPIN TEST: CPT | Performed by: FAMILY MEDICINE

## 2024-04-22 PROCEDURE — 85025 COMPLETE CBC W/AUTO DIFF WBC: CPT | Performed by: FAMILY MEDICINE

## 2024-04-22 PROCEDURE — 82670 ASSAY OF TOTAL ESTRADIOL: CPT | Performed by: FAMILY MEDICINE

## 2024-04-22 PROCEDURE — 99999 PR PBB SHADOW E&M-EST. PATIENT-LVL III: CPT | Mod: PBBFAC,,, | Performed by: FAMILY MEDICINE

## 2024-04-22 PROCEDURE — 86803 HEPATITIS C AB TEST: CPT | Performed by: FAMILY MEDICINE

## 2024-04-22 RX ORDER — BUPROPION HYDROCHLORIDE 150 MG/1
150 TABLET ORAL DAILY
Qty: 30 TABLET | Refills: 11 | Status: SHIPPED | OUTPATIENT
Start: 2024-04-22 | End: 2025-04-22

## 2024-04-22 NOTE — PROGRESS NOTES
Subjective:       Patient ID: Dot Doyle is a 43 y.o. female.    Chief Complaint: Establish Care    HPI  History of Present Illness  The patient is a 43-year-old female who is here today for routine annual exam and to establish care.    The patient was referred to this facility by her OB-GYN, Dr. Jimbo Alicia (Gwen Band), following relocation from [place] three years ago. Over the past four months, she has experienced four instances of illness, the most recent of which occurred a week ago. During one of these episodes, she consulted with an [facility] doctor, during which she was diagnosed with two respiratory viruses in 12/2023 . Her most recent blood work was conducted in 01/2024, which yielded normal results. Despite her efforts to lose weight, she has gained approximately 20 pounds since the onset of the COVID-19 pandemic. She maintains an active lifestyle, engaging in tennis, yoga, weightlifting, and frequent walking, and adheres to a healthy diet, primarily consisting of protein, with minimal sugar and processed food consumption. She reports experiencing hip pain, which she suspects may be a side effect of menopause. She also experienced severe sciatic nerve pain during her pregnancies. She expresses concern about her immune system. She underwent an EGD (some ulceration) and colonoscopy two years ago, which yielded normal results. After a month-long course of acid reflux medication, her condition improved. She has not yet undergone a mammogram due to dense breast tissue and has previously undergone a breast MRI. She has a Mirena IUD in place.    The patient suspects she may be experiencing menopause, as she has not menstruated since 12/2023. She had her hormone levels tested during her OB-GYN visit and is considering retesting.    Supplemental Information  She is seeing a dermatologist for perioral dermatitis. It has worsened this year. She has tried different medications prescribed by her  dermatologist.   She has 3 children.   Her father  of leukemia when he was 47.      Tests to Keep You Healthy    Mammogram: ORDERED BUT NOT SCHEDULED  Colon Cancer Screening: DUE  Cervical Cancer Screening: Met on 2024      Social History     Social History Narrative    ** Merged History Encounter **            Family History   Problem Relation Name Age of Onset    Breast cancer Mother Whit 75        unilatertal, no genetic tesing    Cataracts Mother Whit     Leukemia Father Barron     Stroke Maternal Grandmother Radha     Stroke Maternal Grandfather Bar     Breast cancer Paternal Aunt Mary         after 50    Prostate cancer Paternal Uncle [place] 2         80s?    Lung cancer Paternal Grandmother Mary 60        +smoker    Glaucoma Neg Hx      Macular degeneration Neg Hx         Current Outpatient Medications:     azelaic acid (AZELEX) 15 % gel, Use to  perioral dermatitis, Disp: 50 g, Rfl: 3    DORYX MPC 60 mg TbEC, Take 1 tablet by mouth., Disp: , Rfl:     ELIDEL 1 % cream, 1 application  2 (two) times daily. Apply to affected area, Disp: , Rfl:     Lactobacillus acidophilus (PROBIOTIC ORAL), Take by mouth., Disp: , Rfl:     metronidazole 1% (METROGEL) 1 % Gel, Apply topically 2 (two) times daily. Apply to perioral dermatitis, Disp: 60 g, Rfl: 5    minocycline (AMZEEQ) 4 % Foam, Apply 1 Application topically once daily., Disp: 30 g, Rfl: 5    Current Facility-Administered Medications:     levonorgestreL (MIRENA) 20 mcg/24 hours (8 yrs) 52 mg IUD 1 Intra Uterine Device, 1 Intra Uterine Device, Intrauterine, , Band, Gwen DALY DO, 1 Intra Uterine Device at 10/12/22 1415    Review of Systems   Constitutional:  Positive for fatigue and unexpected weight change. Negative for chills and fever.   Respiratory:  Negative for cough and shortness of breath.    Cardiovascular:  Negative for chest pain.   Gastrointestinal:  Negative for abdominal pain.   Genitourinary:  Positive for menstrual problem (no period in 4  "mo).   Skin:  Negative for rash.   Neurological:  Negative for dizziness.       Objective:   /65 (BP Location: Left arm, Patient Position: Sitting)   Pulse 72   Ht 5' 5" (1.651 m)   Wt 73.6 kg (162 lb 4.1 oz)   SpO2 98%   BMI 27.00 kg/m²      Physical Exam  Vitals reviewed.   Constitutional:       General: She is not in acute distress.     Appearance: She is well-developed. She is not diaphoretic.   HENT:      Head: Normocephalic and atraumatic.      Nose: Nose normal.   Eyes:      General:         Right eye: No discharge.         Left eye: No discharge.      Conjunctiva/sclera: Conjunctivae normal.      Pupils: Pupils are equal, round, and reactive to light.   Neck:      Thyroid: No thyromegaly.   Cardiovascular:      Rate and Rhythm: Normal rate and regular rhythm.      Heart sounds: No murmur heard.  Pulmonary:      Effort: Pulmonary effort is normal. No respiratory distress.      Breath sounds: Normal breath sounds.   Abdominal:      General: There is no distension.      Palpations: Abdomen is soft.   Skin:     General: Skin is warm and dry.      Findings: No rash.   Neurological:      Mental Status: She is alert and oriented to person, place, and time.      Coordination: Coordination normal.   Psychiatric:         Behavior: Behavior normal.         Physical Exam      @resultssec@  Assessment & Plan   Assessment & Plan  1. Annual examination.  The patient's weight gain may be attributed to perimenopausal changes and hormonal fluctuations. A prescription for Wellbutrin, to be taken once daily for a duration of 3 months, has been issued. The patient has been advised to maintain a regular exercise regimen and adhere to a healthy diet. Additionally, a complete blood count (CBC) and hormone levels will be conducted. A baseline hepatitis C screening, lipid panel, and HIV test will be conducted. A mammogram has been scheduled for 05/2024.    Problem List Items Addressed This Visit    None  Visit Diagnoses  "      Routine health maintenance    -  Primary    Relevant Orders    CBC Auto Differential    FOLLICLE STIMULATING HORMONE    ESTRADIOL    Lipid Panel    Hepatitis C Antibody    HIV 1/2 Ag/Ab (4th Gen)              Immunizations Administered on Date of Encounter - 4/22/2024       No immunizations on file.             No follow-ups on file.    This note was generated with the assistance of ambient listening technology. Verbal consent was obtained by the patient and accompanying visitor(s) for the recording of patient appointment to facilitate this note. I attest to having reviewed and edited the generated note for accuracy, though some syntax or spelling errors may persist. Please contact the author of this note for any clarification.      Disclaimer:  This note may have been prepared using voice recognition software, it may have not been extensively proofed, as such there could be errors within the text such as sound alike errors.

## 2024-04-25 ENCOUNTER — PATIENT OUTREACH (OUTPATIENT)
Dept: ADMINISTRATIVE | Facility: HOSPITAL | Age: 44
End: 2024-04-25
Payer: COMMERCIAL

## 2024-05-28 ENCOUNTER — HOSPITAL ENCOUNTER (OUTPATIENT)
Dept: RADIOLOGY | Facility: OTHER | Age: 44
Discharge: HOME | End: 2024-05-28
Attending: FAMILY MEDICINE
Payer: COMMERCIAL

## 2024-05-28 DIAGNOSIS — Z12.31 ENCOUNTER FOR SCREENING MAMMOGRAM FOR BREAST CANCER: ICD-10-CM

## 2024-05-28 PROCEDURE — 77063 BREAST TOMOSYNTHESIS BI: CPT | Mod: TC

## 2024-05-28 PROCEDURE — 77067 SCR MAMMO BI INCL CAD: CPT | Mod: 26,,, | Performed by: RADIOLOGY

## 2024-05-28 PROCEDURE — 77063 BREAST TOMOSYNTHESIS BI: CPT | Mod: 26,,, | Performed by: RADIOLOGY

## 2024-05-28 PROCEDURE — 77067 SCR MAMMO BI INCL CAD: CPT | Mod: TC

## 2024-06-03 NOTE — PROGRESS NOTES
Your mammogram is normal and you need to repeat in 1 year. Let us know if any questions.     Yg Peterson and Dr. Robles

## 2024-06-10 ENCOUNTER — PATIENT MESSAGE (OUTPATIENT)
Dept: INTERNAL MEDICINE | Facility: CLINIC | Age: 44
End: 2024-06-10
Payer: COMMERCIAL

## 2024-08-27 ENCOUNTER — OFFICE VISIT (OUTPATIENT)
Dept: OPTOMETRY | Facility: CLINIC | Age: 44
End: 2024-08-27
Payer: COMMERCIAL

## 2024-08-27 DIAGNOSIS — Z46.0 FITTING AND ADJUSTMENT OF SPECTACLES AND CONTACT LENSES: Primary | ICD-10-CM

## 2024-08-27 DIAGNOSIS — H52.4 MYOPIA WITH PRESBYOPIA OF BOTH EYES: Primary | ICD-10-CM

## 2024-08-27 DIAGNOSIS — H52.13 MYOPIA WITH PRESBYOPIA OF BOTH EYES: Primary | ICD-10-CM

## 2024-08-27 PROCEDURE — 99999 PR PBB SHADOW E&M-EST. PATIENT-LVL III: CPT | Mod: PBBFAC,,, | Performed by: OPTOMETRIST

## 2024-08-27 PROCEDURE — 92015 DETERMINE REFRACTIVE STATE: CPT | Mod: S$GLB,,, | Performed by: OPTOMETRIST

## 2024-08-27 PROCEDURE — 99999 PR PBB SHADOW E&M-EST. PATIENT-LVL II: CPT | Mod: PBBFAC,,, | Performed by: OPTOMETRIST

## 2024-08-27 PROCEDURE — 92014 COMPRE OPH EXAM EST PT 1/>: CPT | Mod: S$GLB,,, | Performed by: OPTOMETRIST

## 2024-08-27 PROCEDURE — 92310 CONTACT LENS FITTING OU: CPT | Mod: CSM,,, | Performed by: OPTOMETRIST

## 2024-08-28 ENCOUNTER — PATIENT MESSAGE (OUTPATIENT)
Dept: OPTOMETRY | Facility: CLINIC | Age: 44
End: 2024-08-28
Payer: COMMERCIAL

## 2024-08-29 NOTE — PROGRESS NOTES
JAVON ROWAN: about 1 yr. Ago elsewhere  Chief complaint (CC): Patient is here for annual eye exam today.  Patient   has multifocal contact but still has trouble reading. Distance vision is   good with contacts.  Glasses are distance only and still seem fine.  Glasses? + 1 yr. old  Contacts? +  H/o eye surgery, injections or laser: -  H/o eye injury: -  Known eye conditions? See above  Family h/o eye conditions? -  Eye gtts?  Allergy drops prn      (-) Flashes (-)  Floaters (-) Mucous   (-)  Tearing (-) Itching (-) Burning   (-) Headaches (-) Eye Pain/discomfort (-) Irritation   (-)  Redness (-) Double vision (-) Blurry vision    Diabetic? -  A1c? -      Last edited by Johanny Couch on 8/27/2024 10:43 AM.            Assessment /Plan     For exam results, see Encounter Report.    Myopia with presbyopia of both eyes      SRx released to patient. Patient educated on lens options. Normal ocular health. RTC 1 year for routine exam.  CLRx trials released. Order more. Ok to pickup.   RTC CLFU and DFE 2 weeks

## 2024-08-30 ENCOUNTER — PATIENT MESSAGE (OUTPATIENT)
Dept: OPTOMETRY | Facility: CLINIC | Age: 44
End: 2024-08-30
Payer: COMMERCIAL

## 2024-09-10 ENCOUNTER — OFFICE VISIT (OUTPATIENT)
Dept: OPTOMETRY | Facility: CLINIC | Age: 44
End: 2024-09-10
Payer: COMMERCIAL

## 2024-09-10 DIAGNOSIS — H52.13 MYOPIA WITH PRESBYOPIA OF BOTH EYES: Primary | ICD-10-CM

## 2024-09-10 DIAGNOSIS — H52.4 MYOPIA WITH PRESBYOPIA OF BOTH EYES: Primary | ICD-10-CM

## 2024-09-10 PROCEDURE — 92499 UNLISTED OPH SVC/PROCEDURE: CPT | Mod: CSM,,, | Performed by: OPTOMETRIST

## 2024-09-10 PROCEDURE — 1159F MED LIST DOCD IN RCRD: CPT | Mod: CPTII,,, | Performed by: OPTOMETRIST

## 2024-09-10 PROCEDURE — 1160F RVW MEDS BY RX/DR IN RCRD: CPT | Mod: CPTII,,, | Performed by: OPTOMETRIST

## 2024-09-10 PROCEDURE — 3044F HG A1C LEVEL LT 7.0%: CPT | Mod: CPTII,,, | Performed by: OPTOMETRIST

## 2024-09-10 NOTE — PROGRESS NOTES
JAVON    HARPAL: 08/24  Chief complaint (CC): Patient is here for a contact follow up with dilated   portion of exam today.  Patient likes the new contacts.  Comfort and   vision is good.  Glasses? +  Contacts? +  H/o eye surgery, injections or laser: -  H/o eye injury: -  Known eye conditions? See above  Family h/o eye conditions? -  Eye gtts? Allergy drops prn      (-) Flashes (-)  Floaters (-) Mucous   (-)  Tearing (-) Itching (-) Burning   (-) Headaches (-) Eye Pain/discomfort (-) Irritation   (-)  Redness (-) Double vision (-) Blurry vision    Diabetic? -  A1c? -      Last edited by Johanny Couch on 9/10/2024  9:06 AM.            Assessment /Plan     For exam results, see Encounter Report.    Myopia with presbyopia of both eyes      CLRx updated. Wear and care reviewed. RTC 1 year.

## 2024-10-12 ENCOUNTER — PATIENT MESSAGE (OUTPATIENT)
Dept: OPTOMETRY | Facility: CLINIC | Age: 44
End: 2024-10-12
Payer: COMMERCIAL

## 2024-12-18 ENCOUNTER — OFFICE VISIT (OUTPATIENT)
Dept: INTERNAL MEDICINE | Facility: CLINIC | Age: 44
End: 2024-12-18
Payer: COMMERCIAL

## 2024-12-18 ENCOUNTER — PATIENT MESSAGE (OUTPATIENT)
Dept: OBSTETRICS AND GYNECOLOGY | Facility: CLINIC | Age: 44
End: 2024-12-18
Payer: COMMERCIAL

## 2024-12-18 ENCOUNTER — PATIENT MESSAGE (OUTPATIENT)
Dept: INTERNAL MEDICINE | Facility: CLINIC | Age: 44
End: 2024-12-18
Payer: COMMERCIAL

## 2024-12-18 VITALS
BODY MASS INDEX: 27.03 KG/M2 | SYSTOLIC BLOOD PRESSURE: 124 MMHG | WEIGHT: 162.25 LBS | HEIGHT: 65 IN | DIASTOLIC BLOOD PRESSURE: 65 MMHG

## 2024-12-18 DIAGNOSIS — J11.1 INFLUENZA: Primary | ICD-10-CM

## 2024-12-18 PROCEDURE — 3078F DIAST BP <80 MM HG: CPT | Mod: CPTII,95,,

## 2024-12-18 PROCEDURE — 3044F HG A1C LEVEL LT 7.0%: CPT | Mod: CPTII,95,,

## 2024-12-18 PROCEDURE — 1159F MED LIST DOCD IN RCRD: CPT | Mod: CPTII,95,,

## 2024-12-18 PROCEDURE — 3008F BODY MASS INDEX DOCD: CPT | Mod: CPTII,95,,

## 2024-12-18 PROCEDURE — 3074F SYST BP LT 130 MM HG: CPT | Mod: CPTII,95,,

## 2024-12-18 PROCEDURE — 1160F RVW MEDS BY RX/DR IN RCRD: CPT | Mod: CPTII,95,,

## 2024-12-18 PROCEDURE — 99213 OFFICE O/P EST LOW 20 MIN: CPT | Mod: 95,,,

## 2024-12-18 RX ORDER — OSELTAMIVIR PHOSPHATE 75 MG/1
75 CAPSULE ORAL 2 TIMES DAILY
Qty: 10 CAPSULE | Refills: 0 | Status: SHIPPED | OUTPATIENT
Start: 2024-12-18 | End: 2024-12-23

## 2024-12-18 NOTE — PROGRESS NOTES
"Telehealth Visit  [facility]      The patient location is: Home in [place]   The chief complaint leading to consultation is: flu-like symptoms  Visit type: audiovisual  Face to Face time with patient: 20 minutes      HPI: Dot Doyle is a 44 y.o. year old female who presents for evaluation of flu-like symptoms    FLU-LIKE SYMPTOMS:  She reports onset of flu-like symptoms including stomachache, sore throat, and headache with chills. She has had household exposure to influenza through three children with confirmed flu diagnoses. Her children's pediatrician recommended patient be seen for potential need for tamiflu. Patient denies fevers however notes her most recent temperature this morning was 99F.     Physical Exam:   Gen- NAD, appears stated age  Resp- Breathing comfortably on RA  Neuro- Alert, spontaneous movements  Psych- Calm, cooperative, logical thought process     Assessment and plan:      Dot Spears" was seen today for influenza.    Diagnoses and all orders for this visit:    Influenza  -     oseltamivir (TAMIFLU) 75 MG capsule; Take 1 capsule (75 mg total) by mouth 2 (two) times daily. for 5 days    PLAN SUMMARY:  Supportive care measures for viral infection explained  Home COVID-19 test recommended  Use Tylenol or ibuprofen as needed for symptom relief  [facility] [address] [facility]  Stay hydrated     Follow-up: prn    20 minutes of total time spent on the encounter, which includes face to face time and non-face to face time preparing to see the patient (eg, review of tests), Obtaining and/or reviewing separately obtained history, Documenting clinical information in the electronic or other health record, Independently interpreting results (not separately reported) and communicating results to the patient/family/caregiver, or Care coordination (not separately reported).         This service was not originating from a related E/M service " provided within the previous 7 days nor will  to an E/M service or procedure within the next 24 hours or my soonest available appointment.  Prevailing standard of care was able to be met in this audio-only visit.      Nolan Aj MD  [facility]

## 2024-12-18 NOTE — TELEPHONE ENCOUNTER
Sent ticket to schedule a virtual visit. I was unsure if you wanted to call in a prescription for the flu?

## 2025-01-28 ENCOUNTER — LAB VISIT (OUTPATIENT)
Dept: LAB | Facility: OTHER | Age: 45
End: 2025-01-28
Attending: OBSTETRICS & GYNECOLOGY
Payer: COMMERCIAL

## 2025-01-28 ENCOUNTER — OFFICE VISIT (OUTPATIENT)
Dept: OBSTETRICS AND GYNECOLOGY | Facility: CLINIC | Age: 45
End: 2025-01-28
Attending: OBSTETRICS & GYNECOLOGY
Payer: COMMERCIAL

## 2025-01-28 VITALS
BODY MASS INDEX: 27.58 KG/M2 | HEIGHT: 65 IN | DIASTOLIC BLOOD PRESSURE: 74 MMHG | WEIGHT: 165.56 LBS | SYSTOLIC BLOOD PRESSURE: 126 MMHG

## 2025-01-28 DIAGNOSIS — Z01.419 WELL WOMAN EXAM WITH ROUTINE GYNECOLOGICAL EXAM: Primary | ICD-10-CM

## 2025-01-28 DIAGNOSIS — Z01.419 WELL WOMAN EXAM WITH ROUTINE GYNECOLOGICAL EXAM: ICD-10-CM

## 2025-01-28 DIAGNOSIS — Z91.89 INCREASED RISK OF BREAST CANCER: ICD-10-CM

## 2025-01-28 DIAGNOSIS — Z30.431 IUD CHECK UP: ICD-10-CM

## 2025-01-28 DIAGNOSIS — N95.2 VAGINAL ATROPHY: ICD-10-CM

## 2025-01-28 DIAGNOSIS — Z12.39 SCREENING BREAST EXAMINATION: ICD-10-CM

## 2025-01-28 LAB
25(OH)D3+25(OH)D2 SERPL-MCNC: 42 NG/ML (ref 30–96)
ALBUMIN SERPL BCP-MCNC: 4.6 G/DL (ref 3.5–5.2)
ALP SERPL-CCNC: 63 U/L (ref 40–150)
ALT SERPL W/O P-5'-P-CCNC: 11 U/L (ref 10–44)
ANION GAP SERPL CALC-SCNC: 12 MMOL/L (ref 8–16)
AST SERPL-CCNC: 21 U/L (ref 10–40)
BASOPHILS # BLD AUTO: 0.07 K/UL (ref 0–0.2)
BASOPHILS NFR BLD: 1 % (ref 0–1.9)
BILIRUB SERPL-MCNC: 0.4 MG/DL (ref 0.1–1)
BUN SERPL-MCNC: 19 MG/DL (ref 6–20)
CALCIUM SERPL-MCNC: 9.8 MG/DL (ref 8.7–10.5)
CHLORIDE SERPL-SCNC: 104 MMOL/L (ref 95–110)
CO2 SERPL-SCNC: 23 MMOL/L (ref 23–29)
CREAT SERPL-MCNC: 1 MG/DL (ref 0.5–1.4)
DIFFERENTIAL METHOD BLD: ABNORMAL
EOSINOPHIL # BLD AUTO: 0.1 K/UL (ref 0–0.5)
EOSINOPHIL NFR BLD: 0.9 % (ref 0–8)
ERYTHROCYTE [DISTWIDTH] IN BLOOD BY AUTOMATED COUNT: 12.3 % (ref 11.5–14.5)
EST. GFR  (NO RACE VARIABLE): >60 ML/MIN/1.73 M^2
ESTIMATED AVG GLUCOSE: 103 MG/DL (ref 68–131)
ESTRADIOL SERPL-MCNC: 234 PG/ML
FSH SERPL-ACNC: 7.51 MIU/ML
GLUCOSE SERPL-MCNC: 79 MG/DL (ref 70–110)
HBA1C MFR BLD: 5.2 % (ref 4–5.6)
HCT VFR BLD AUTO: 38.4 % (ref 37–48.5)
HGB BLD-MCNC: 12.9 G/DL (ref 12–16)
IMM GRANULOCYTES # BLD AUTO: 0.03 K/UL (ref 0–0.04)
IMM GRANULOCYTES NFR BLD AUTO: 0.4 % (ref 0–0.5)
LYMPHOCYTES # BLD AUTO: 1.9 K/UL (ref 1–4.8)
LYMPHOCYTES NFR BLD: 27.6 % (ref 18–48)
MCH RBC QN AUTO: 32.9 PG (ref 27–31)
MCHC RBC AUTO-ENTMCNC: 33.6 G/DL (ref 32–36)
MCV RBC AUTO: 98 FL (ref 82–98)
MONOCYTES # BLD AUTO: 0.5 K/UL (ref 0.3–1)
MONOCYTES NFR BLD: 7.1 % (ref 4–15)
NEUTROPHILS # BLD AUTO: 4.3 K/UL (ref 1.8–7.7)
NEUTROPHILS NFR BLD: 63 % (ref 38–73)
NRBC BLD-RTO: 0 /100 WBC
PLATELET # BLD AUTO: 265 K/UL (ref 150–450)
PMV BLD AUTO: 9.2 FL (ref 9.2–12.9)
POTASSIUM SERPL-SCNC: 4.6 MMOL/L (ref 3.5–5.1)
PROT SERPL-MCNC: 7.7 G/DL (ref 6–8.4)
RBC # BLD AUTO: 3.92 M/UL (ref 4–5.4)
SODIUM SERPL-SCNC: 139 MMOL/L (ref 136–145)
T4 FREE SERPL-MCNC: 0.97 NG/DL (ref 0.71–1.51)
TSH SERPL DL<=0.005 MIU/L-ACNC: 1.32 UIU/ML (ref 0.4–4)
WBC # BLD AUTO: 6.8 K/UL (ref 3.9–12.7)

## 2025-01-28 PROCEDURE — 85025 COMPLETE CBC W/AUTO DIFF WBC: CPT | Performed by: OBSTETRICS & GYNECOLOGY

## 2025-01-28 PROCEDURE — 83036 HEMOGLOBIN GLYCOSYLATED A1C: CPT | Performed by: OBSTETRICS & GYNECOLOGY

## 2025-01-28 PROCEDURE — 99999 PR PBB SHADOW E&M-EST. PATIENT-LVL III: CPT | Mod: PBBFAC,,, | Performed by: OBSTETRICS & GYNECOLOGY

## 2025-01-28 PROCEDURE — 1159F MED LIST DOCD IN RCRD: CPT | Mod: CPTII,S$GLB,, | Performed by: OBSTETRICS & GYNECOLOGY

## 2025-01-28 PROCEDURE — 99396 PREV VISIT EST AGE 40-64: CPT | Mod: S$GLB,,, | Performed by: OBSTETRICS & GYNECOLOGY

## 2025-01-28 PROCEDURE — 84439 ASSAY OF FREE THYROXINE: CPT | Performed by: OBSTETRICS & GYNECOLOGY

## 2025-01-28 PROCEDURE — 83001 ASSAY OF GONADOTROPIN (FSH): CPT | Performed by: OBSTETRICS & GYNECOLOGY

## 2025-01-28 PROCEDURE — 80053 COMPREHEN METABOLIC PANEL: CPT | Performed by: OBSTETRICS & GYNECOLOGY

## 2025-01-28 PROCEDURE — 3078F DIAST BP <80 MM HG: CPT | Mod: CPTII,S$GLB,, | Performed by: OBSTETRICS & GYNECOLOGY

## 2025-01-28 PROCEDURE — 82670 ASSAY OF TOTAL ESTRADIOL: CPT | Performed by: OBSTETRICS & GYNECOLOGY

## 2025-01-28 PROCEDURE — 3008F BODY MASS INDEX DOCD: CPT | Mod: CPTII,S$GLB,, | Performed by: OBSTETRICS & GYNECOLOGY

## 2025-01-28 PROCEDURE — 3074F SYST BP LT 130 MM HG: CPT | Mod: CPTII,S$GLB,, | Performed by: OBSTETRICS & GYNECOLOGY

## 2025-01-28 PROCEDURE — 82306 VITAMIN D 25 HYDROXY: CPT | Performed by: OBSTETRICS & GYNECOLOGY

## 2025-01-28 PROCEDURE — 84443 ASSAY THYROID STIM HORMONE: CPT | Performed by: OBSTETRICS & GYNECOLOGY

## 2025-01-28 RX ORDER — ESTRADIOL 0.1 MG/G
1 CREAM VAGINAL
Qty: 42.5 G | Refills: 1 | Status: SHIPPED | OUTPATIENT
Start: 2025-01-29 | End: 2026-01-29

## 2025-01-28 NOTE — PROGRESS NOTES
CC: Well woman exam    Dot Doyle is a 44 y.o. female  presents for well woman exam.  LMP: No LMP recorded. (Menstrual status: Birth Control)..    Sleep is disrupted, has hot flashes and night sweats.  History of hetero factor V, has taken COCs in the past.   Mirena in place.       Past Medical History:   Diagnosis Date    Abnormal Pap smear of cervix     8 yrs ago    Factor 5 Leiden mutation, heterozygous     Lyme disease     MTHFR mutation      Past Surgical History:   Procedure Laterality Date    BREAST BIOPSY      benign milk duct, fibrous change    DIAGNOSTIC LAPAROSCOPY      HERNIA REPAIR  2019    TONSILLECTOMY       Social History     Socioeconomic History    Marital status:    Occupational History     Comment: self employed- [employer]   Tobacco Use    Smoking status: Never     Passive exposure: Never    Smokeless tobacco: Never   Substance and Sexual Activity    Alcohol use: Yes    Drug use: Never    Sexual activity: Yes     Partners: Male     Birth control/protection: I.U.D.     Comment: Paragard placed 2018   Social History Narrative    ** Merged History Encounter **          Social Drivers of Health     Financial Resource Strain: Low Risk  (2024)    Overall Financial Resource Strain (CARDIA)     Difficulty of Paying Living Expenses: Not hard at all   Food Insecurity: Patient Declined (2024)    Hunger Vital Sign     Worried About Running Out of Food in the Last Year: Patient declined     Ran Out of Food in the Last Year: Patient declined   Physical Activity: Sufficiently Active (2024)    Exercise Vital Sign     Days of Exercise per Week: 6 days     Minutes of Exercise per Session: 60 min   Stress: No Stress Concern Present (2024)    [employer] [place] [employer] - Occupational Stress Questionnaire     Feeling of Stress : Only a little   Housing Stability: Unknown (2024)    Housing Stability Vital Sign     Unable to Pay for Housing in the  "Last Year: Patient declined     Family History   Problem Relation Name Age of Onset    Breast cancer Mother Whit 75        unilatertal, no genetic tesing    Cataracts Mother Whit     Leukemia Father Barron     Stroke Maternal Grandmother Radha     Stroke Maternal Grandfather Bar     Breast cancer Paternal Aunt Mary         after 50    Prostate cancer Paternal Uncle [place] 2         80s?    Lung cancer Paternal Grandmother Mary 60        +smoker    Glaucoma Neg Hx      Macular degeneration Neg Hx       OB History          6    Para   3    Term   3       0    AB   3    Living   3         SAB   3    IAB   0    Ectopic   0    Multiple   0    Live Births   3                 /74   Ht 5' 5" (1.651 m)   Wt 75.1 kg (165 lb 9.1 oz)   BMI 27.55 kg/m²       ROS:  GENERAL: Denies weight gain or weight loss. Feeling well overall.   SKIN: Denies rash or lesions.   HEAD: Denies head injury or headache.   NODES: Denies enlarged lymph nodes.   CHEST: Denies chest pain or shortness of breath.   CARDIOVASCULAR: Denies palpitations or left sided chest pain.   ABDOMEN: No abdominal pain, constipation, diarrhea, nausea, vomiting or rectal bleeding.   URINARY: No frequency, dysuria, hematuria, or burning on urination.  REPRODUCTIVE: See HPI.   BREASTS: The patient performs breast self-examination and denies pain, lumps, or nipple discharge.   HEMATOLOGIC: No easy bruisability or excessive bleeding.   MUSCULOSKELETAL: Denies joint pain or swelling.   NEUROLOGIC: Denies syncope or weakness.   PSYCHIATRIC: Denies depression, anxiety or mood swings.    PHYSICAL EXAM:  APPEARANCE: Well nourished, well developed, in no acute distress.  AFFECT: WNL, alert and oriented x 3  SKIN: No acne or hirsutism  NECK: Neck symmetric without masses or thyromegaly  NODES: No inguinal, cervical, axillary, or femoral lymph node enlargement  CHEST: Good respiratory effect  ABDOMEN: Soft.  No tenderness or masses.  No hepatosplenomegaly.  No " hernias.  BREASTS: Symmetrical, no skin changes or visible lesions.  No palpable masses, nipple discharge bilaterally.  PELVIC: Normal external genitalia without lesions.  Normal hair distribution.  Adequate perineal body, normal urethral meatus.  Vagina thin with low estrogen changes.   Cervix pink, without lesions, discharge or tenderness.  No significant cystocele or rectocele.  Bimanual exam shows uterus to be normal size, regular, mobile and nontender.  Adnexa without masses or tenderness.    EXTREMITIES: No edema.    Well woman exam with routine gynecological exam  -     CBC Auto Differential; Future; Expected date: 01/28/2025  -     Comprehensive Metabolic Panel; Future; Expected date: 01/28/2025  -     Hemoglobin A1C; Future; Expected date: 01/28/2025  -     TSH; Future; Expected date: 01/28/2025  -     T4, Free; Future; Expected date: 01/28/2025  -     Vitamin D; Future; Expected date: 01/28/2025  -     Follicle Stimulating Hormone; Future; Expected date: 01/28/2025  -     Estradiol; Future; Expected date: 01/28/2025    Screening breast examination  -     Mammo Digital Screening Bilat w/ Brennan; Future; Expected date: 01/28/2025    IUD check up    Vaginal atrophy  -     estradioL (ESTRACE) 0.01 % (0.1 mg/gram) vaginal cream; Place 1 g vaginally 3 (three) times a week.  Dispense: 42.5 g; Refill: 1    Increased risk of breast cancer  -     MRI Breast w/wo Contrast, w/CAD, Bilateral; Future; Expected date: 04/01/2025            Patient was counseled today on A.C.S. Pap guidelines and recommendations for yearly pelvic exams, mammograms and monthly self breast exams; to see her PCP for other health maintenance.     No follow-ups on file.

## 2025-04-08 ENCOUNTER — HOSPITAL ENCOUNTER (OUTPATIENT)
Dept: RADIOLOGY | Facility: OTHER | Age: 45
Discharge: HOME | End: 2025-04-08
Attending: OBSTETRICS & GYNECOLOGY
Payer: COMMERCIAL

## 2025-04-08 DIAGNOSIS — Z91.89 INCREASED RISK OF BREAST CANCER: ICD-10-CM

## 2025-04-08 PROCEDURE — 77049 MRI BREAST C-+ W/CAD BI: CPT | Mod: TC

## 2025-04-08 PROCEDURE — A9577 INJ MULTIHANCE: HCPCS | Performed by: OBSTETRICS & GYNECOLOGY

## 2025-04-08 PROCEDURE — 77049 MRI BREAST C-+ W/CAD BI: CPT | Mod: 26,,, | Performed by: RADIOLOGY

## 2025-04-08 PROCEDURE — 25500020 PHARM REV CODE 255: Performed by: OBSTETRICS & GYNECOLOGY

## 2025-04-08 RX ADMIN — GADOBENATE DIMEGLUMINE 15 ML: 529 INJECTION, SOLUTION INTRAVENOUS at 10:04

## 2025-04-09 ENCOUNTER — RESULTS FOLLOW-UP (OUTPATIENT)
Dept: OBSTETRICS AND GYNECOLOGY | Facility: CLINIC | Age: 45
End: 2025-04-09

## 2025-07-09 ENCOUNTER — PATIENT MESSAGE (OUTPATIENT)
Dept: INTERNAL MEDICINE | Facility: CLINIC | Age: 45
End: 2025-07-09
Payer: COMMERCIAL

## 2025-07-10 ENCOUNTER — PATIENT MESSAGE (OUTPATIENT)
Dept: INTERNAL MEDICINE | Facility: CLINIC | Age: 45
End: 2025-07-10
Payer: COMMERCIAL

## 2025-07-14 ENCOUNTER — PATIENT MESSAGE (OUTPATIENT)
Dept: INTERNAL MEDICINE | Facility: CLINIC | Age: 45
End: 2025-07-14

## 2025-07-14 ENCOUNTER — OFFICE VISIT (OUTPATIENT)
Dept: INTERNAL MEDICINE | Facility: CLINIC | Age: 45
End: 2025-07-14
Payer: COMMERCIAL

## 2025-07-14 VITALS
BODY MASS INDEX: 27.58 KG/M2 | OXYGEN SATURATION: 98 % | SYSTOLIC BLOOD PRESSURE: 126 MMHG | DIASTOLIC BLOOD PRESSURE: 74 MMHG | WEIGHT: 165.56 LBS | HEIGHT: 65 IN

## 2025-07-14 DIAGNOSIS — E66.3 OVERWEIGHT (BMI 25.0-29.9): Primary | ICD-10-CM

## 2025-07-14 RX ORDER — TIRZEPATIDE 2.5 MG/.5ML
2.5 INJECTION, SOLUTION SUBCUTANEOUS
Qty: 2 ML | Refills: 0 | Status: SHIPPED | OUTPATIENT
Start: 2025-07-14 | End: 2025-07-14

## 2025-07-14 RX ORDER — TIRZEPATIDE 2.5 MG/.5ML
2.5 INJECTION, SOLUTION SUBCUTANEOUS
Qty: 2 ML | Refills: 0 | Status: SHIPPED | OUTPATIENT
Start: 2025-07-14

## 2025-07-14 RX ORDER — ONDANSETRON 4 MG/1
4 TABLET, ORALLY DISINTEGRATING ORAL EVERY 8 HOURS PRN
Qty: 20 TABLET | Refills: 1 | Status: SHIPPED | OUTPATIENT
Start: 2025-07-14 | End: 2025-07-14

## 2025-07-14 RX ORDER — ONDANSETRON 4 MG/1
4 TABLET, ORALLY DISINTEGRATING ORAL EVERY 8 HOURS PRN
Qty: 20 TABLET | Refills: 1 | Status: SHIPPED | OUTPATIENT
Start: 2025-07-14

## 2025-07-14 NOTE — PROGRESS NOTES
Subjective:       Patient ID: Dot Doyle is a 44 y.o. female.    The patient location is: [place]  The chief complaint leading to consultation is:   Chief Complaint   Patient presents with    Weight Loss     Discuss weight loss options          Visit type: audiovisual    Face to Face time with patient: 20 minutes of total time spent on the encounter, which includes face to face time and non-face to face time preparing to see the patient (eg, review of tests), Obtaining and/or reviewing separately obtained history, Documenting clinical information in the electronic or other health record, Independently interpreting results (not separately reported) and communicating results to the patient/family/caregiver, or Care coordination (not separately reported).       Each patient to whom he or she provides medical services by telemedicine is:  (1) informed of the relationship between the physician and patient and the respective role of any other health care provider with respect to management of the patient; and (2) notified that he or she may decline to receive medical services by telemedicine and may withdraw from such care at any time.    Notes:     HPI  History of Present Illness    CHIEF COMPLAINT:  Dot presents today to discuss weight management and explore treatment options.    WEIGHT MANAGEMENT:  She currently weighs 165 lbs, stable between 162-165 lbs since April 2023. She reports a 25-pound weight gain since COVID that has been resistant to consistent weight loss efforts. Despite following a high protein diet that makes her feel good, she has been unable to achieve weight loss. She reports feeling healthy, strong, and in good shape, but expresses frustration with continued weight gain despite following traditional weight management approaches. She is now ready to explore medical interventions for weight management.    LABS AND IMAGING:  Recent labs by OB showed no unusual findings, with normal thyroid and female  hormone levels, including estradiol and FSH. Breast MRI in April was normal, with recommendation for mammogram in October.      ROS:  General: -fever, -chills, -fatigue, +weight gain, -weight loss  Eyes: -vision changes, -redness, -discharge  ENT: -ear pain, -nasal congestion, -sore throat  Cardiovascular: -chest pain, -palpitations, -lower extremity edema  Respiratory: -cough, -shortness of breath  Gastrointestinal: -abdominal pain, +nausea, -vomiting, -diarrhea, -constipation, -blood in stool  Genitourinary: -dysuria, -hematuria, -frequency  Musculoskeletal: -joint pain, -muscle pain  Skin: -rash, -lesion  Neurological: -headache, -dizziness, -numbness, -tingling  Psychiatric: -anxiety, -depression, -sleep difficulty           Tests to Keep You Healthy    Mammogram: ORDERED BUT NOT SCHEDULED  Colon Cancer Screening: Met on 7/26/2021  Cervical Cancer Screening: Met on 1/9/2024      Social History     Social History Narrative    ** Merged History Encounter **            Family History   Problem Relation Name Age of Onset    Breast cancer Mother Whit 75        unilatertal, no genetic tesing    Cataracts Mother Whit     Leukemia Father Barron     Stroke Maternal Grandmother Radha     Stroke Maternal Grandfather Bar     Breast cancer Paternal Aunt Mary         after 50    Prostate cancer Paternal Uncle [place] 2         80s?    Lung cancer Paternal Grandmother Mary 60        +smoker    Glaucoma Neg Hx      Macular degeneration Neg Hx       Current Medications[1]    Review of Systems   Constitutional:  Positive for unexpected weight change. Negative for activity change.   HENT:  Negative for hearing loss, rhinorrhea and trouble swallowing.    Eyes:  Negative for discharge and visual disturbance.   Respiratory:  Negative for chest tightness and wheezing.    Cardiovascular:  Negative for chest pain and palpitations.   Gastrointestinal:  Negative for blood in stool, constipation, diarrhea and vomiting.   Endocrine: Negative  "for polydipsia and polyuria.   Genitourinary:  Negative for difficulty urinating, dysuria, hematuria and menstrual problem.   Musculoskeletal:  Negative for arthralgias, joint swelling and neck pain.   Neurological:  Negative for weakness and headaches.   Psychiatric/Behavioral:  Negative for confusion and dysphoric mood.        Objective:   /74   Ht 5' 5" (1.651 m)   Wt 75.1 kg (165 lb 9.1 oz)   SpO2 98%   BMI 27.55 kg/m²      Physical Exam  Constitutional:       General: She is not in acute distress.     Appearance: She is well-developed.   HENT:      Head: Normocephalic.   Pulmonary:      Effort: Pulmonary effort is normal. No respiratory distress.   Neurological:      Mental Status: She is alert and oriented to person, place, and time.   Psychiatric:         Behavior: Behavior normal.         Physical Exam              @resultssec@  Assessment & Plan   Assessment & Plan    IMPRESSION:   Candidate for weight loss medication due to unsuccessful attempts with lifestyle modifications alone.   Recommend tirzepatide (Zepbound) as preferred injectable option due to availability through [facility] pharmacy program.   Reviewed recent lab results from Dr. Mckay, including thyroid and hormone levels, which showed no indications for endocrinology referral at this time.   Determined breast MRI in April was sufficient, negating need for immediate mammogram.    PLAN SUMMARY:   Thyroid levels evaluated and found normal   Prescribed ondansetron (Zofran) as needed for nausea   Initiated tirzepatide (Zepbound) 2.5 mg weekly injection with titration plan   Considered Metformin for mild weight loss and glucose metabolism improvement   Advised to continue current exercise regimen and maintain high-protein diet   Follow up in October for mammogram   No endocrinology referral needed    ABNORMAL WEIGHT GAIN:   Monitored patient's weight, which has been stable around 165 lbs with a 25-pound gain since [place] " feels healthy despite weight gain but is frustrated with lack of progress in weight loss.   Initiated tirzepatide (Zepbound) 2.5 mg weekly injection with a titration plan to increase to 5 mg after 1 month, then potentially to 7.5 mg in the 3rd month.   Explained mechanism of action including effects on gastric motility and satiety.   Discussed potential side effects, particularly nausea and muscle loss, and prescribed ondansetron (Zofran) as needed for nausea.   Considered Metformin for mild weight loss and glucose metabolism improvement.   Advised patient to continue current exercise regimen and maintain high-protein diet to mitigate risk of muscle loss while on medication.   Instructed to contact office if experiencing persistent side effects or concerns.    THYROID FUNCTION:   Evaluated thyroid levels which were found to be normal by OB and Dr. Mckay.   No endocrinology referral needed based on these results.    FOLLOW-UP:   Follow up in October for mammogram.         Problem List Items Addressed This Visit    None  Visit Diagnoses         Overweight (BMI 25.0-29.9)    -  Primary    Relevant Medications    tirzepatide, weight loss, (ZEPBOUND) 2.5 mg/0.5 mL PnIj              Immunizations Administered on Date of Encounter - 7/14/2025       No immunizations on file.             No follow-ups on file.    This note was generated with the assistance of ambient listening technology. Verbal consent was obtained by the patient and accompanying visitor(s) for the recording of patient appointment to facilitate this note. I attest to having reviewed and edited the generated note for accuracy, though some syntax or spelling errors may persist. Please contact the author of this note for any clarification.      Disclaimer:  This note may have been prepared using voice recognition software, it may have not been extensively proofed, as such there could be errors within the text such as sound alike errors.         [1]   Current  Outpatient Medications:     DORYX MPC 60 mg TbEC, Take 1 tablet by mouth., Disp: , Rfl:     estradioL (ESTRACE) 0.01 % (0.1 mg/gram) vaginal cream, Place 1 g vaginally 3 (three) times a week., Disp: 42.5 g, Rfl: 1    Lactobacillus acidophilus (PROBIOTIC ORAL), Take by mouth., Disp: , Rfl:     metronidazole 1% (METROGEL) 1 % Gel, Apply topically 2 (two) times daily. Apply to perioral dermatitis, Disp: 60 g, Rfl: 5    ondansetron (ZOFRAN-ODT) 4 MG TbDL, Take 1 tablet (4 mg total) by mouth every 8 (eight) hours as needed (nausea)., Disp: 20 tablet, Rfl: 1    tirzepatide, weight loss, (ZEPBOUND) 2.5 mg/0.5 mL PnIj, Inject 2.5 mg into the skin every 7 days., Disp: 2 mL, Rfl: 0    Current Facility-Administered Medications:     levonorgestreL (MIRENA) 20 mcg/24 hours (8 yrs) 52 mg IUD 1 Intra Uterine Device, 1 Intra Uterine Device, Intrauterine, , Band, Gwen DALY DO, 1 Intra Uterine Device at 10/12/22 3784

## 2025-07-14 NOTE — TELEPHONE ENCOUNTER
Zepbound Rx sent for pen injectors.   Rx pended for vials for [facility] for review. Thank you!    LOV with Oscar Robles DO , 7/14/2025

## 2025-07-30 DIAGNOSIS — E66.3 OVERWEIGHT (BMI 25.0-29.9): ICD-10-CM

## 2025-07-30 NOTE — TELEPHONE ENCOUNTER
Care Due:                  Date            Visit Type   Department     Provider  --------------------------------------------------------------------------------                                ESTABLISHED                              PATIENT -    [facility] INTERNAL  Last Visit: 07-      [facility]       Oscar Robles  Next Visit: None Scheduled  None         None Found                                                            Last  Test          Frequency    Reason                     Performed    Due Date  --------------------------------------------------------------------------------    HBA1C.......  6 months...  tirzepatide,.............  01- 07-    [facility] Embedded Care Due Messages. Reference number: [id number].   7/30/2025 12:54:21 PM CDT

## 2025-07-31 DIAGNOSIS — E66.3 OVERWEIGHT (BMI 25.0-29.9): Primary | ICD-10-CM

## 2025-07-31 RX ORDER — TIRZEPATIDE 5 MG/.5ML
5 INJECTION, SOLUTION SUBCUTANEOUS
Qty: 2 ML | Refills: 0 | Status: SHIPPED | OUTPATIENT
Start: 2025-07-31 | End: 2025-08-01

## 2025-07-31 NOTE — TELEPHONE ENCOUNTER
Received fax from [facility] stating there is an issue with script sent in for tirzepatide (zepbound).     tirzepatide, weight loss, (ZEPBOUND) 5 mg/0.5 mL PnIj was sent in, but for self pay it is supposed to be the vials.    tirzepatide, weight loss, (ZEPBOUND) 5 mg/0.5 mL Soln (Vial) pended.     Refill Encounter  Allergies: Confirmed    PCP Visits:   Recent Visits  Date Type Provider Dept   07/14/25 Office Visit Oscar Robles DO [facility] Internal Medicine   Showing recent visits within past 360 days and meeting all other requirements  Future Appointments  No visits were found meeting these conditions.  Showing future appointments within next 720 days and meeting all other requirements     Last 3 Blood Pressure:   BP Readings from Last 3 Encounters:   07/14/25 126/74   01/28/25 126/74   12/18/24 124/65     Preferred Pharmacy:   [facility] [place] [id number] [address]  [address] [place] [facility] [address]  Phone: [phone] Fax: [phone]    Requested RX:  Requested Prescriptions     Pending Prescriptions Disp Refills    tirzepatide, weight loss, (ZEPBOUND) 5 mg/0.5 mL Soln 2 mL 0     Sig: Inject 0.5 mLs (5 mg total) into the skin every 7 days.      RX Route: Normal

## 2025-07-31 NOTE — TELEPHONE ENCOUNTER
Refill Routing Note   Medication(s) are not appropriate for processing by [facility] for the following reason(s):        Required labs outdated  New or recently adjusted medication    [facility] action(s):  Defer   Requires labs : Yes             Appointments  past 12m or future 3m with PCP    Date Provider   Last Visit   7/14/2025 Oscar Robles, DO   Next Visit   Visit date not found Oscar Robles, DO   ED visits in past 90 days: 0        Note composed:7:46 PM 07/30/2025

## 2025-08-01 RX ORDER — TIRZEPATIDE 5 MG/.5ML
5 INJECTION, SOLUTION SUBCUTANEOUS
Qty: 2 ML | Refills: 0 | Status: SHIPPED | OUTPATIENT
Start: 2025-08-01 | End: 2025-08-29

## 2025-08-05 ENCOUNTER — PATIENT MESSAGE (OUTPATIENT)
Dept: INTERNAL MEDICINE | Facility: CLINIC | Age: 45
End: 2025-08-05
Payer: COMMERCIAL

## 2025-08-26 ENCOUNTER — PATIENT MESSAGE (OUTPATIENT)
Dept: INTERNAL MEDICINE | Facility: CLINIC | Age: 45
End: 2025-08-26
Payer: COMMERCIAL

## 2025-08-26 DIAGNOSIS — E66.3 OVERWEIGHT (BMI 25.0-29.9): ICD-10-CM

## 2025-08-27 RX ORDER — TIRZEPATIDE 2.5 MG/.5ML
2.5 INJECTION, SOLUTION SUBCUTANEOUS
Qty: 2 ML | Refills: 5 | Status: SHIPPED | OUTPATIENT
Start: 2025-08-27